# Patient Record
Sex: MALE | Race: WHITE | NOT HISPANIC OR LATINO | Employment: FULL TIME | URBAN - METROPOLITAN AREA
[De-identification: names, ages, dates, MRNs, and addresses within clinical notes are randomized per-mention and may not be internally consistent; named-entity substitution may affect disease eponyms.]

---

## 2023-02-08 ENCOUNTER — APPOINTMENT (OUTPATIENT)
Dept: MRI IMAGING | Facility: CLINIC | Age: 58
End: 2023-02-08
Attending: INTERNAL MEDICINE
Payer: COMMERCIAL

## 2023-02-08 ENCOUNTER — APPOINTMENT (OUTPATIENT)
Dept: CT IMAGING | Facility: CLINIC | Age: 58
End: 2023-02-08
Attending: EMERGENCY MEDICINE
Payer: COMMERCIAL

## 2023-02-08 ENCOUNTER — HOSPITAL ENCOUNTER (OUTPATIENT)
Facility: CLINIC | Age: 58
Setting detail: OBSERVATION
Discharge: HOME | End: 2023-02-12
Attending: EMERGENCY MEDICINE | Admitting: EMERGENCY MEDICINE
Payer: COMMERCIAL

## 2023-02-08 DIAGNOSIS — I10 HYPERTENSION, UNSPECIFIED TYPE: ICD-10-CM

## 2023-02-08 DIAGNOSIS — E78.5 HYPERLIPIDEMIA WITH TARGET LDL LESS THAN 130: Primary | ICD-10-CM

## 2023-02-08 DIAGNOSIS — R94.39 ABNORMAL CARDIOVASCULAR STRESS TEST: ICD-10-CM

## 2023-02-08 DIAGNOSIS — I48.0 PAROXYSMAL ATRIAL FIBRILLATION (H): ICD-10-CM

## 2023-02-08 DIAGNOSIS — I25.110 CORONARY ARTERY DISEASE INVOLVING NATIVE CORONARY ARTERY OF NATIVE HEART WITH UNSTABLE ANGINA PECTORIS (H): ICD-10-CM

## 2023-02-08 DIAGNOSIS — R20.2 PARESTHESIAS: ICD-10-CM

## 2023-02-08 DIAGNOSIS — R94.31 ABNORMAL ELECTROCARDIOGRAM: ICD-10-CM

## 2023-02-08 DIAGNOSIS — R07.9 CHEST PAIN, UNSPECIFIED TYPE: ICD-10-CM

## 2023-02-08 LAB
ALBUMIN SERPL BCG-MCNC: 4.6 G/DL (ref 3.5–5.2)
ALBUMIN UR-MCNC: NEGATIVE MG/DL
ALP SERPL-CCNC: 52 U/L (ref 40–129)
ALT SERPL W P-5'-P-CCNC: 23 U/L (ref 10–50)
ANION GAP SERPL CALCULATED.3IONS-SCNC: 12 MMOL/L (ref 7–15)
APPEARANCE UR: CLEAR
AST SERPL W P-5'-P-CCNC: 32 U/L (ref 10–50)
ATRIAL RATE - MUSE: 100 BPM
ATRIAL RATE - MUSE: 79 BPM
BASOPHILS # BLD AUTO: 0 10E3/UL (ref 0–0.2)
BASOPHILS NFR BLD AUTO: 0 %
BILIRUB SERPL-MCNC: 0.6 MG/DL
BILIRUB UR QL STRIP: NEGATIVE
BUN SERPL-MCNC: 9.9 MG/DL (ref 6–20)
CALCIUM SERPL-MCNC: 9.3 MG/DL (ref 8.6–10)
CHLORIDE SERPL-SCNC: 98 MMOL/L (ref 98–107)
COLOR UR AUTO: NORMAL
CREAT SERPL-MCNC: 0.88 MG/DL (ref 0.67–1.17)
DEPRECATED HCO3 PLAS-SCNC: 27 MMOL/L (ref 22–29)
DIASTOLIC BLOOD PRESSURE - MUSE: NORMAL MMHG
DIASTOLIC BLOOD PRESSURE - MUSE: NORMAL MMHG
EOSINOPHIL # BLD AUTO: 0.1 10E3/UL (ref 0–0.7)
EOSINOPHIL NFR BLD AUTO: 2 %
ERYTHROCYTE [DISTWIDTH] IN BLOOD BY AUTOMATED COUNT: 12.2 % (ref 10–15)
GFR SERPL CREATININE-BSD FRML MDRD: >90 ML/MIN/1.73M2
GLUCOSE SERPL-MCNC: 150 MG/DL (ref 70–99)
GLUCOSE UR STRIP-MCNC: NEGATIVE MG/DL
HCT VFR BLD AUTO: 44.9 % (ref 40–53)
HGB BLD-MCNC: 14.7 G/DL (ref 13.3–17.7)
HGB UR QL STRIP: NEGATIVE
HOLD SPECIMEN: NORMAL
HOLD SPECIMEN: NORMAL
IMM GRANULOCYTES # BLD: 0 10E3/UL
IMM GRANULOCYTES NFR BLD: 0 %
INTERPRETATION ECG - MUSE: NORMAL
INTERPRETATION ECG - MUSE: NORMAL
KETONES UR STRIP-MCNC: NEGATIVE MG/DL
LEUKOCYTE ESTERASE UR QL STRIP: NEGATIVE
LYMPHOCYTES # BLD AUTO: 2.6 10E3/UL (ref 0.8–5.3)
LYMPHOCYTES NFR BLD AUTO: 31 %
MCH RBC QN AUTO: 28.1 PG (ref 26.5–33)
MCHC RBC AUTO-ENTMCNC: 32.7 G/DL (ref 31.5–36.5)
MCV RBC AUTO: 86 FL (ref 78–100)
MONOCYTES # BLD AUTO: 0.6 10E3/UL (ref 0–1.3)
MONOCYTES NFR BLD AUTO: 8 %
NEUTROPHILS # BLD AUTO: 4.9 10E3/UL (ref 1.6–8.3)
NEUTROPHILS NFR BLD AUTO: 59 %
NITRATE UR QL: NEGATIVE
NRBC # BLD AUTO: 0 10E3/UL
NRBC BLD AUTO-RTO: 0 /100
P AXIS - MUSE: 45 DEGREES
P AXIS - MUSE: 52 DEGREES
PH UR STRIP: 6.5 [PH] (ref 5–7)
PLATELET # BLD AUTO: 177 10E3/UL (ref 150–450)
POTASSIUM SERPL-SCNC: 3.5 MMOL/L (ref 3.4–5.3)
PR INTERVAL - MUSE: 166 MS
PR INTERVAL - MUSE: 176 MS
PROT SERPL-MCNC: 7.2 G/DL (ref 6.4–8.3)
QRS DURATION - MUSE: 140 MS
QRS DURATION - MUSE: 146 MS
QT - MUSE: 392 MS
QT - MUSE: 434 MS
QTC - MUSE: 497 MS
QTC - MUSE: 505 MS
R AXIS - MUSE: 14 DEGREES
R AXIS - MUSE: 28 DEGREES
RBC # BLD AUTO: 5.24 10E6/UL (ref 4.4–5.9)
RBC URINE: <1 /HPF
SODIUM SERPL-SCNC: 137 MMOL/L (ref 136–145)
SP GR UR STRIP: 1.02 (ref 1–1.03)
SYSTOLIC BLOOD PRESSURE - MUSE: NORMAL MMHG
SYSTOLIC BLOOD PRESSURE - MUSE: NORMAL MMHG
T AXIS - MUSE: 111 DEGREES
T AXIS - MUSE: 118 DEGREES
TROPONIN T SERPL HS-MCNC: 12 NG/L
TROPONIN T SERPL HS-MCNC: 14 NG/L
TROPONIN T SERPL HS-MCNC: 17 NG/L
TROPONIN T SERPL HS-MCNC: 9 NG/L
UROBILINOGEN UR STRIP-MCNC: NORMAL MG/DL
VENTRICULAR RATE- MUSE: 100 BPM
VENTRICULAR RATE- MUSE: 79 BPM
WBC # BLD AUTO: 8.3 10E3/UL (ref 4–11)
WBC URINE: <1 /HPF

## 2023-02-08 PROCEDURE — 81001 URINALYSIS AUTO W/SCOPE: CPT | Performed by: EMERGENCY MEDICINE

## 2023-02-08 PROCEDURE — 93005 ELECTROCARDIOGRAM TRACING: CPT | Mod: 76

## 2023-02-08 PROCEDURE — 84484 ASSAY OF TROPONIN QUANT: CPT | Performed by: EMERGENCY MEDICINE

## 2023-02-08 PROCEDURE — 250N000013 HC RX MED GY IP 250 OP 250 PS 637: Performed by: INTERNAL MEDICINE

## 2023-02-08 PROCEDURE — 93005 ELECTROCARDIOGRAM TRACING: CPT

## 2023-02-08 PROCEDURE — 74177 CT ABD & PELVIS W/CONTRAST: CPT

## 2023-02-08 PROCEDURE — 99285 EMERGENCY DEPT VISIT HI MDM: CPT | Mod: 25

## 2023-02-08 PROCEDURE — 36415 COLL VENOUS BLD VENIPUNCTURE: CPT | Performed by: EMERGENCY MEDICINE

## 2023-02-08 PROCEDURE — 96360 HYDRATION IV INFUSION INIT: CPT | Mod: 59

## 2023-02-08 PROCEDURE — 96361 HYDRATE IV INFUSION ADD-ON: CPT

## 2023-02-08 PROCEDURE — 99222 1ST HOSP IP/OBS MODERATE 55: CPT | Mod: AI | Performed by: INTERNAL MEDICINE

## 2023-02-08 PROCEDURE — 70450 CT HEAD/BRAIN W/O DYE: CPT

## 2023-02-08 PROCEDURE — 80053 COMPREHEN METABOLIC PANEL: CPT | Performed by: EMERGENCY MEDICINE

## 2023-02-08 PROCEDURE — 84484 ASSAY OF TROPONIN QUANT: CPT | Mod: 91 | Performed by: INTERNAL MEDICINE

## 2023-02-08 PROCEDURE — 36415 COLL VENOUS BLD VENIPUNCTURE: CPT | Performed by: INTERNAL MEDICINE

## 2023-02-08 PROCEDURE — 258N000003 HC RX IP 258 OP 636: Performed by: EMERGENCY MEDICINE

## 2023-02-08 PROCEDURE — G0378 HOSPITAL OBSERVATION PER HR: HCPCS

## 2023-02-08 PROCEDURE — 250N000009 HC RX 250: Performed by: EMERGENCY MEDICINE

## 2023-02-08 PROCEDURE — 70551 MRI BRAIN STEM W/O DYE: CPT

## 2023-02-08 PROCEDURE — 250N000011 HC RX IP 250 OP 636: Performed by: EMERGENCY MEDICINE

## 2023-02-08 PROCEDURE — 85025 COMPLETE CBC W/AUTO DIFF WBC: CPT | Performed by: EMERGENCY MEDICINE

## 2023-02-08 RX ORDER — LISINOPRIL 40 MG/1
40 TABLET ORAL DAILY
COMMUNITY
End: 2023-07-17

## 2023-02-08 RX ORDER — NITROGLYCERIN 0.4 MG/1
0.4 TABLET SUBLINGUAL EVERY 5 MIN PRN
Status: DISCONTINUED | OUTPATIENT
Start: 2023-02-08 | End: 2023-02-12 | Stop reason: HOSPADM

## 2023-02-08 RX ORDER — ASPIRIN 81 MG/1
162 TABLET, CHEWABLE ORAL ONCE
Status: DISCONTINUED | OUTPATIENT
Start: 2023-02-08 | End: 2023-02-10

## 2023-02-08 RX ORDER — LISINOPRIL 40 MG/1
40 TABLET ORAL DAILY
Status: DISCONTINUED | OUTPATIENT
Start: 2023-02-08 | End: 2023-02-12 | Stop reason: HOSPADM

## 2023-02-08 RX ORDER — METOPROLOL SUCCINATE 100 MG/1
100 TABLET, EXTENDED RELEASE ORAL DAILY
COMMUNITY
End: 2023-07-17

## 2023-02-08 RX ORDER — UREA 10 %
500 LOTION (ML) TOPICAL DAILY
COMMUNITY

## 2023-02-08 RX ORDER — ROSUVASTATIN CALCIUM 20 MG/1
20 TABLET, COATED ORAL DAILY
Status: ON HOLD | COMMUNITY
End: 2023-02-12

## 2023-02-08 RX ORDER — ESCITALOPRAM OXALATE 10 MG/1
10 TABLET ORAL DAILY
COMMUNITY

## 2023-02-08 RX ORDER — FEXOFENADINE HCL 180 MG/1
180 TABLET ORAL DAILY
Status: ON HOLD | COMMUNITY
End: 2023-02-12

## 2023-02-08 RX ORDER — MULTIVIT WITH MINERALS/LUTEIN
1000 TABLET ORAL DAILY
COMMUNITY

## 2023-02-08 RX ORDER — SODIUM CHLORIDE 9 MG/ML
INJECTION, SOLUTION INTRAVENOUS CONTINUOUS
Status: DISCONTINUED | OUTPATIENT
Start: 2023-02-08 | End: 2023-02-09

## 2023-02-08 RX ORDER — HYDRALAZINE HYDROCHLORIDE 20 MG/ML
10 INJECTION INTRAMUSCULAR; INTRAVENOUS EVERY 4 HOURS PRN
Status: DISCONTINUED | OUTPATIENT
Start: 2023-02-08 | End: 2023-02-08

## 2023-02-08 RX ORDER — ROSUVASTATIN CALCIUM 10 MG/1
20 TABLET, COATED ORAL AT BEDTIME
Status: DISCONTINUED | OUTPATIENT
Start: 2023-02-08 | End: 2023-02-10

## 2023-02-08 RX ORDER — ACETAMINOPHEN 325 MG/1
650 TABLET ORAL EVERY 6 HOURS PRN
Status: DISCONTINUED | OUTPATIENT
Start: 2023-02-08 | End: 2023-02-12 | Stop reason: HOSPADM

## 2023-02-08 RX ORDER — MAGNESIUM HYDROXIDE/ALUMINUM HYDROXICE/SIMETHICONE 120; 1200; 1200 MG/30ML; MG/30ML; MG/30ML
30 SUSPENSION ORAL EVERY 4 HOURS PRN
Status: DISCONTINUED | OUTPATIENT
Start: 2023-02-08 | End: 2023-02-12 | Stop reason: HOSPADM

## 2023-02-08 RX ORDER — ASPIRIN 81 MG/1
81 TABLET ORAL DAILY
Status: DISCONTINUED | OUTPATIENT
Start: 2023-02-09 | End: 2023-02-12 | Stop reason: HOSPADM

## 2023-02-08 RX ORDER — ACETAMINOPHEN 650 MG/1
650 SUPPOSITORY RECTAL EVERY 6 HOURS PRN
Status: DISCONTINUED | OUTPATIENT
Start: 2023-02-08 | End: 2023-02-12 | Stop reason: HOSPADM

## 2023-02-08 RX ORDER — ESCITALOPRAM OXALATE 10 MG/1
10 TABLET ORAL AT BEDTIME
Status: DISCONTINUED | OUTPATIENT
Start: 2023-02-08 | End: 2023-02-12 | Stop reason: HOSPADM

## 2023-02-08 RX ORDER — IOPAMIDOL 755 MG/ML
80 INJECTION, SOLUTION INTRAVASCULAR ONCE
Status: COMPLETED | OUTPATIENT
Start: 2023-02-08 | End: 2023-02-08

## 2023-02-08 RX ORDER — NITROGLYCERIN 0.4 MG/1
0.4 TABLET SUBLINGUAL ONCE
Status: COMPLETED | OUTPATIENT
Start: 2023-02-08 | End: 2023-02-08

## 2023-02-08 RX ORDER — HYDRALAZINE HYDROCHLORIDE 20 MG/ML
10 INJECTION INTRAMUSCULAR; INTRAVENOUS EVERY 4 HOURS PRN
Status: DISCONTINUED | OUTPATIENT
Start: 2023-02-08 | End: 2023-02-09

## 2023-02-08 RX ORDER — CYANOCOBALAMIN (VITAMIN B-12) 500 MCG
400 LOZENGE ORAL DAILY
COMMUNITY

## 2023-02-08 RX ADMIN — ROSUVASTATIN CALCIUM 20 MG: 10 TABLET, FILM COATED ORAL at 20:21

## 2023-02-08 RX ADMIN — LISINOPRIL 40 MG: 40 TABLET ORAL at 20:21

## 2023-02-08 RX ADMIN — SODIUM CHLORIDE: 9 INJECTION, SOLUTION INTRAVENOUS at 20:22

## 2023-02-08 RX ADMIN — SODIUM CHLORIDE 1000 ML: 9 INJECTION, SOLUTION INTRAVENOUS at 14:22

## 2023-02-08 RX ADMIN — SODIUM CHLORIDE: 9 INJECTION, SOLUTION INTRAVENOUS at 16:13

## 2023-02-08 RX ADMIN — SODIUM CHLORIDE 80 ML: 900 INJECTION INTRAVENOUS at 13:25

## 2023-02-08 RX ADMIN — IOPAMIDOL 80 ML: 755 INJECTION, SOLUTION INTRAVENOUS at 13:25

## 2023-02-08 RX ADMIN — ESCITALOPRAM OXALATE 10 MG: 10 TABLET, FILM COATED ORAL at 20:22

## 2023-02-08 ASSESSMENT — ACTIVITIES OF DAILY LIVING (ADL)
ADLS_ACUITY_SCORE: 35
ADLS_ACUITY_SCORE: 35
ADLS_ACUITY_SCORE: 31
ADLS_ACUITY_SCORE: 35

## 2023-02-08 ASSESSMENT — ENCOUNTER SYMPTOMS
HEADACHES: 1
HEMATURIA: 0
DIARRHEA: 0
NUMBNESS: 1
DYSURIA: 0
BLOOD IN STOOL: 0
ABDOMINAL PAIN: 0
NERVOUS/ANXIOUS: 1

## 2023-02-08 NOTE — ED NOTES
[facility]  ED Nurse Handoff Report    ED Chief complaint: Chest Pain and Stroke Symptoms      ED Diagnosis:   Final diagnoses:   Chest pain, unspecified type   Paresthesias   Abnormal electrocardiogram       Code Status: To be addressed by admitting MD.     Allergies:   Allergies   Allergen Reactions    Penicillin G Rash       Patient Story:   Pt presents to the ED via EMS for evaluation of chest pressure and left sided tingling.     Focused Assessment:    Cardiac: Left bundle branch block. Tachycardiac in the 110s. 0/10 chest pain in ED when offered Nitroglycerin.   Respiratory: even and unlabored.   Neuro: Alert and oriented x4. Speech clear and logical, no numbness or tingling in ED.     Plan for MRI and Stress Test. Family at bedside in the ED.     Treatments and/or interventions provided:   Labs Ordered and Resulted from Time of ED Arrival to Time of ED Departure   COMPREHENSIVE METABOLIC PANEL - Abnormal       Result Value    Sodium 137      Potassium 3.5      Chloride 98      Carbon Dioxide (CO2) 27      Anion Gap 12      Urea Nitrogen 9.9      Creatinine 0.88      Calcium 9.3      Glucose 150 (*)     Alkaline Phosphatase 52      AST 32      ALT 23      Protein Total 7.2      Albumin 4.6      Bilirubin Total 0.6      GFR Estimate >90     TROPONIN T, HIGH SENSITIVITY - Normal    Troponin T, High Sensitivity 9     CBC WITH PLATELETS AND DIFFERENTIAL    WBC Count 8.3      RBC Count 5.24      Hemoglobin 14.7      Hematocrit 44.9      MCV 86      MCH 28.1      MCHC 32.7      RDW 12.2      Platelet Count 177      % Neutrophils 59      % Lymphocytes 31      % Monocytes 8      % Eosinophils 2      % Basophils 0      % Immature Granulocytes 0      NRBCs per 100 WBC 0      Absolute Neutrophils 4.9      Absolute Lymphocytes 2.6      Absolute Monocytes 0.6      Absolute Eosinophils 0.1      Absolute Basophils 0.0      Absolute Immature Granulocytes 0.0      Absolute NRBCs 0.0     ROUTINE UA WITH  MICROSCOPIC REFLEX TO CULTURE       Patient's response to treatments and/or interventions: Tolerated well     To be done/followed up on inpatient unit:  Further evaluation with Cardiology.     Does this patient have any cognitive concerns?:  NA    Activity level - Baseline/Home:  Independent  Activity Level - Current:   Stand with Assist    Patient's Preferred language: English   Needed?: No    Isolation: None  Infection: Not Applicable  Patient tested for COVID 19 prior to admission: YES  Bariatric?: No    Vital Signs:   Vitals:    02/08/23 1426 02/08/23 1440 02/08/23 1441 02/08/23 1456   BP: (!) 164/103  (!) 155/93    Pulse: 114 87 86 82   Resp: 19 15 17 16   Temp:       TempSrc:       SpO2: 93% 93% 94% 93%   Weight:       Height:           Cardiac Rhythm:Cardiac Rhythm: (S) Sinus tachycardia    Was the PSS-3 completed:   Yes  What interventions are required if any?               Family Comments: Family at bedside in ED.   OBS brochure/video discussed/provided to patient/family: N/A              Name of person given brochure if not patient: NA              Relationship to patient: NA    For the majority of the shift this patient's behavior was Green.   Behavioral interventions performed were frequent rounding.    ED NURSE PHONE NUMBER: [phone]

## 2023-02-08 NOTE — H&P
[facility]    History and Physical  Hospitalist       Date of Admission:  2/8/2023    Assessment & Plan   Maximus Harris Jr. is a 58 year old male who with history of hypertension, hyperlipidemia and anxiety presented to the emergency room with chest pain.    Chest pain  Essential hypertension  Hyperlipidemia  -Patient presents with atypical chest pain  -Initial troponin is negative  -EKG shows left bundle branch block which is of unknown chronicity, but on reviewing stress echo from 2014, patient did develop a left bundle branch block with exercise, therefore suspect this is old.  -Last stress test was in 2014 also was discharged on event monitor at that time and was found to have sinus dysrhythmia.  -I do suspect majority of his symptoms are related to anxiety, however he has significant risk factors therefore it will be prudent to do a stress test in the morning  -CT aortic survey with contrast was negative  -If he rules out we will get a nuclear stress test in the morning.  -Resume prior to admission lisinopril  -Hold metoprolol for stress test tomorrow  -Pressure is quite uncontrolled, likely will need to add low-dose diuretic  -As needed hydralazine available for now  -Wife also mentioned that patient snores a lot, given his uncontrolled blood pressure I advised them considering sleep study as outpatient.    Left upper extremity and circumoral tingling  -Patient endorses tingling involving entire left upper extremity and also circumoral area.  Again suspect this is due to anxiety however he also has a significant headache at the moment which is very unusual for him  -CT head is negative  -We will get an MRI of the brain     Right posterior chest wall pain  -Patient endorses pain around the right trapezius muscle, reportedly he has recently started elliptical training  -I suspect this is musculoskeletal, if cardiac cause ruled out, he can follow-up with PCP  -Denies any radicular  "symptoms on the right side or midline back pain.    Anxiety  -Suspect majority of his symptoms are related to anxiety although will need to rule out organic cause  -Resume prior to admission Lexapro and close follow-up with PCP      Clinically Significant Risk Factors Present on Admission                  # Hypertension: home medication list includes antihypertensive(s)      # Overweight: Estimated body mass index is 27.66 kg/m  as calculated from the following:    Height as of this encounter: 1.765 m (5' 9.5\").    Weight as of this encounter: 86.2 kg (190 lb).             Medical Decision Making       **CLEAR ALL SELECTIONS**        DVT Prophylaxis: Pneumatic Compression Devices  Code Status: Full Code    Disposition: Expected discharge in < 2 days    Figueroa Hartley MD, MD    Primary Care Physician   Abram Bethea    Chief Complaint   Chest pain    History is obtained from the patient    History of Present Illness   Maximus Harris Jr. is a 58 year old male who presents with chest pain.  Patient mentions that he initially noted pain around the right shoulder blade few weeks ago.  He was not sure what it might be related to but he recently started doing elliptical and thought he might have tweaked his muscles.  However earlier today he had some left-sided chest pain which was about 3/10 in intensity associated with left arm tingling and circumoral tingling at work which bothered him and he requested his boss to drive him to the hospital.  He denies dyspnea per se.  No cough.  No fever.  He does exercise on an elliptical for about an hour and does not report any exertional chest pain.  Denies visual changes.  No diplopia.  No slurring of speech.  No facial deviation.  No weakness of any extremities.  No tingling/numbness of lower extremities.  Denies nausea, vomiting.  No dysuria urinary urgency or frequency.  Wife reports that he does snore quite a bit at night.  She also noticed couple of days ago he woke " up in the middle of the night and was sweating quite a bit.  Denies any escalated level of stress at work.  He does have a history of anxiety but does not believe that this is any worse than baseline.  He tells me that his blood pressure does stay on the higher side around mid 140s/90s despite being on 2 antihypertensives.    Past Medical History    I have reviewed this patient's medical history and updated it with pertinent information if needed.   Past Medical History:   Diagnosis Date     HTN (hypertension)      Hyperlipidemia LDL goal < 130      Nephrolithiasis        Past Surgical History   I have reviewed this patient's surgical history and updated it with pertinent information if needed.  Past Surgical History:   Procedure Laterality Date     HERNIA REPAIR, INGUINAL RT/LT         Prior to Admission Medications   Prior to Admission Medications   Prescriptions Last Dose Informant Patient Reported? Taking?   aspirin 81 MG tablet   No No   Sig: Take 1 tablet (81 mg) by mouth daily   Patient taking differently: Take 81 mg by mouth every evening    atorvastatin (LIPITOR) 10 MG tablet   No No   Sig: Take 1 tablet (10 mg) by mouth daily   Patient taking differently: Take 10 mg by mouth every evening    cetirizine (ZYRTEC) 10 MG tablet   Yes No   Sig: Take 10 mg by mouth daily   lisinopril (PRINIVIL,ZESTRIL) 20 MG tablet   No No   Sig: Take 1 tablet (20 mg) by mouth daily   metoprolol (LOPRESSOR) 50 MG tablet   No No   Sig: Take 1 tablet (50 mg) by mouth 2 times daily   multivitamin, therapeutic with minerals (THERA-VIT-M) TABS   Yes No   Sig: Take 1 tablet by mouth daily      Facility-Administered Medications: None     Allergies   Allergies   Allergen Reactions     Penicillin G Rash       Social History   I have reviewed this patient's social history and updated it with pertinent information if needed. Maximus Harris Jr.  reports that he has never smoked. He does not have any smokeless tobacco history on file. He  reports that he does not drink alcohol and does not use drugs.    Family History   I have reviewed this patient's family history and updated it with pertinent information if needed.   Family History   Problem Relation Age of Onset     Heart Disease Mother         MI     Heart Disease Father         MI, COPD     Arthritis Brother         lupus     Cancer Sister         ovarian cancer DM     Diabetes Sister        Review of Systems   The 10 point Review of Systems is negative other than noted in the HPI or here.     Physical Exam   Temp: 98  F (36.7  C) Temp src: Temporal BP: (!) 155/93 Pulse: 82   Resp: 16 SpO2: 93 % O2 Device: None (Room air)    Vital Signs with Ranges  Temp:  [98  F (36.7  C)] 98  F (36.7  C)  Pulse:  [] 82  Resp:  [10-21] 16  BP: (155-209)/() 155/93  SpO2:  [93 %-97 %] 93 %  190 lbs 0 oz    Gen: AAOX3, NAD, anxious  HEENT: Moist oral mucosa, no pallor or icterus  Neck: Supple neck, good ROM, no stridor  Resp: CTA B/L, normal WOB; no crackles; no wheezes  CVS- RRR, no murmur, no edema  Abd/GI: Soft, non-tender. BS- normoactive  Skin- Warm, dry, no rashes  MSK: ?  Mild tenderness over right trapezius muscle, no significant restriction of movement.  No midline tenderness.  Neuro- CN- intact. Moving X 4; No focal deficits.     Data   Data reviewed today:  I personally reviewed the EKG tracing showing Normal sinus rhythm with features of LV hypertrophy and LBBB.        Recent Labs   Lab 02/08/23  1316   WBC 8.3   HGB 14.7   MCV 86         POTASSIUM 3.5   CHLORIDE 98   CO2 27   BUN 9.9   CR 0.88   ANIONGAP 12   DAVID 9.3   *   ALBUMIN 4.6   PROTTOTAL 7.2   BILITOTAL 0.6   ALKPHOS 52   ALT 23   AST 32       Recent Results (from the past 24 hour(s))   Head CT w/o contrast    Narrative    CT SCAN OF THE HEAD WITHOUT CONTRAST   2/8/2023 1:43 PM     HISTORY: Headache, left arm paresthesias.    TECHNIQUE: Axial images of the head and coronal reformations without  IV contrast  material. Radiation dose for this scan was reduced using  automated exposure control, adjustment of the mA and/or kV according  to patient size, or iterative reconstruction technique.    COMPARISON: None.    FINDINGS:  No evidence of hemorrhage. Parenchyma within normal limits for age. No  acute osseous abnormality.      Impression    IMPRESSION:   No acute intracranial abnormality.     CT Aortic Survey w Contrast    Narrative    CT AORTIC SURVEY WITH CONTRAST 2/8/2023 1:44 PM    CLINICAL HISTORY: Acute left chest tightness and left arm  paresthesias. Right upper back and shoulder pain. Headache.    TECHNIQUE: Aortic survey protocol CT chest, abdomen, and pelvis.  Arterial phase through the chest, abdomen, and pelvis. Precontrast  images were also performed through the chest. Dose reduction  techniques were used.   CONTRAST: 80 mL [id number]    COMPARISON: None.    FINDINGS:  VASCULATURE: No evidence for aortic aneurysm or dissection. The great  vessels, renal arteries, celiac artery, SMA, and RABIA are patent. There  is mild atherosclerotic calcification of the abdominal aorta. The  pulmonary arteries are moderately well opacified, and there is no  evidence for pulmonary embolism. Circumaortic left renal vein.    LUNGS AND PLEURA: No pleural effusions. Small calcified granuloma in  the right middle lobe. The lungs are otherwise clear. No lung masses  or consolidations. No pneumothorax.    MEDIASTINUM/AXILLAE: No enlarged lymph nodes in the chest. No  pericardial effusion.    CORONARY ARTERY CALCIFICATION: Mild.    HEPATOBILIARY: Hepatic steatosis. No hepatic masses. No calcified  gallstones.    PANCREAS: Normal.    SPLEEN: Normal.    ADRENAL GLANDS: Normal.    KIDNEYS/BLADDER: Mild diffuse bladder wall thickening could be related  to prostatic enlargement, although cystitis could also have this  appearance. The kidneys are unremarkable. No hydronephrosis.    BOWEL: No bowel obstruction. No convincing evidence for  colitis or  diverticulitis. Unremarkable appendix.    PELVIC ORGANS: Mild prostatic enlargement.    LYMPH NODES: No enlarged lymph nodes are identified in the abdomen or  pelvis.    ADDITIONAL FINDINGS: None.    MUSCULOSKELETAL: Unremarkable.      Impression    IMPRESSION:  1.  No evidence for aortic aneurysm or dissection.  2.  Hepatic steatosis.  3.  Mild diffuse bladder wall thickening could be related to prostatic  enlargement, although cystitis could also have this appearance.    SALMA LYMAN MD         SYSTEM ID:  [id number]

## 2023-02-08 NOTE — Clinical Note
The first balloon was inserted into the left anterior descending.Max pressure = 8 alyssia. Total duration = 16 seconds.

## 2023-02-08 NOTE — Clinical Note
The first balloon was inserted into the left anterior descending.Max pressure = 12 alyssia. Total duration = 15 seconds.     Max pressure = 14 alyssia. Total duration = 15 seconds.    Balloon reinflated a second time: Max pressure = 14 alyssia. Total duration = 15 seconds.  Balloon reinflated a third time: Max pressure = 14 alyssia. Total duration = 9 seconds.

## 2023-02-08 NOTE — ED PROVIDER NOTES
History     Chief Complaint:  Chest Pain and Stroke Symptoms       HPI   Maximus Harris is a 58 year old male with a history of Hypertension, Hyperlipidemia, and anxiety who presents with chest pain that started earlier today. The patient reports the last two weeks he has had an intermittent abnormal tingling sensation around the right shoulder blade radiating into the shoulder, neck and the head. Patient reports that today he is having some left arm tingling, but denies any right sided symptoms. He says that this tingling started around 0915 this morning and was really bad by noon. Patient reports feeling moderately light headed and mildly nauseated. He denies abdominal pain, diarrhea, blood in the stool, dysuria, hematuria. Patient says that currently he does not have chest pain however notes some right sided chest tightness. He denies leg swelling. He denies history of DVT, PE, or recent surgeries. Patient says that today is the first time he has felt any left sided symptoms and specifies that today was also the first time he has had chest symptoms. Patient says at 0915 he was just sitting down at work. Patient reports that he is most concerned about his headache and paresthesias currently. He says that he feels very anxious.     Independent Historian:   None - Patient Only    Review of External Notes:  Looked through care everywhere for old EKGs or old EKG readings included in the note from May 20, 2022 by Dr.Leong Unger, but none found.    ROS:  Review of Systems   Cardiovascular: Positive for chest pain. Negative for leg swelling.   Gastrointestinal: Negative for abdominal pain, blood in stool and diarrhea.   Genitourinary: Negative for dysuria and hematuria.   Neurological: Positive for numbness and headaches.   Psychiatric/Behavioral: The patient is nervous/anxious.    All other systems reviewed and are negative.    Allergies:  Penicillin G     Medications:   "  Aspirin  Atorvastatin  Lisinopril  Metoprolol  escitalopram  Rosuvastatin    Past Medical History:   Hypertension   Hyperlipidemia   nephrolithiasis   Anxiety   B12 deficiency  Vertigo     Past Surgical History:    Hernia repair  Lithotripsy   Refractive surgery     Family History:    Cancer  Diabetes  Heart disease    Social History:  Reports that he has never smoked. He does not have any smokeless tobacco history on file. He reports that he does not drink alcohol and does not use drugs.    Physical Exam     Patient Vitals for the past 24 hrs:   BP Temp Temp src Pulse Resp SpO2 Height Weight   02/08/23 1527 -- -- -- 89 26 95 % -- --   02/08/23 1524 -- -- -- 93 (!) 31 95 % -- --   02/08/23 1509 -- -- -- 87 25 92 % -- --   02/08/23 1456 -- -- -- 82 16 93 % -- --   02/08/23 1454 -- -- -- 90 16 93 % -- --   02/08/23 1441 (!) 155/93 -- -- 86 17 94 % -- --   02/08/23 1440 -- -- -- 87 15 93 % -- --   02/08/23 1426 (!) 164/103 -- -- 114 19 93 % -- --   02/08/23 1423 -- -- -- 90 14 93 % -- --   02/08/23 1410 (!) 155/86 -- -- 85 18 93 % -- --   02/08/23 1408 -- -- -- 83 10 93 % -- --   02/08/23 1353 -- -- -- 92 17 94 % -- --   02/08/23 1340 (!) 175/90 -- -- 97 17 96 % -- --   02/08/23 1338 -- -- -- 101 16 94 % -- --   02/08/23 1325 -- -- -- 107 21 96 % -- --   02/08/23 1323 (!) 209/117 -- -- -- -- -- -- --   02/08/23 1310 (!) 197/113 -- -- 110 -- 95 % -- --   02/08/23 1301 -- -- -- -- -- 97 % -- --   02/08/23 1259 (!) 189/109 -- -- 101 -- 96 % -- --   02/08/23 1252 (!) 190/111 98  F (36.7  C) Temporal 101 16 96 % 1.765 m (5' 9.5\") 86.2 kg (190 lb)      Physical Exam  General:  Sitting on bed. Uncomfortable appearing. Slightly diaphoretic.   HENT:  No obvious trauma to head  Right Ear:  External ear normal.   Left Ear:  External ear normal.   Nose:  Nose normal.   Eyes:  Conjunctivae and EOM are normal. Pupils are equal, round, and reactive.   Neck: Normal range of motion. Neck supple. No tracheal deviation present.   CV:  " Normal heart sounds. No murmur heard.  Pulm/Chest: Effort normal and breath sounds normal.   Abd: Soft. No distension. There is no tenderness. There is no rigidity, no rebound and no guarding.   M/S: Normal range of motion.   Neuro: Awake and alert. CN II-XII Grossly intact, no pronator drift, normal finger-nose-finger, visual fields intact by confrontation. Muscle strength is +5 proximal and distal in the bilateral upper and lower extremities. No dysarthria. Normal palm up, palm down. Radial pulse +2 bilateral.   Skin: Skin is warm and dry. No rash noted. Not diaphoretic.  No rash of shingles to chest or back bilateral.  Psych: Normal mood and affect. Behavior is normal.     Emergency Department Course   ECG #1  ECG taken at 1301, ECG read at 1302  Normal sinus rhythm. Possible left atrial enlargement. Left ventricular hypertrophy with QRS widening (estrada product). Nonspecific T wave abnormality. Abnormal ECG   New changes as compared to prior, dated 10/28/14. No other EKG in Care everywhere.   Rate 86 bpm. AL interval 170 ms. QRS duration 140 ms. QT/QTc 416/497 ms. P-R-T axes 45 21 95.     ECG #2  ECG taken at 1340, ECG read at 1341  Normal sinus rhythm. Possible left atrial enlargement. Left ventricular hypertrophy with QRS widening and repolarization abnormality (estrada product). Abnormal ECG  Rate 100 bpm. AL interval 166 ms. QRS duration 140 ms. QT/QTc 392/505 ms. P-R-T axes 52 14 111.    Imaging:  CT Aortic Survey w Contrast   Final Result   IMPRESSION:   1.  No evidence for aortic aneurysm or dissection.   2.  Hepatic steatosis.   3.  Mild diffuse bladder wall thickening could be related to prostatic   enlargement, although cystitis could also have this appearance.      SALMA LYMAN MD            SYSTEM ID:  [id number]      Head CT w/o contrast   Final Result   IMPRESSION:    No acute intracranial abnormality.      EMERSON GRAY MD            SYSTEM ID:  [id number]         Report per  radiology    Laboratory:  Labs Ordered and Resulted from Time of ED Arrival to Time of ED Departure   COMPREHENSIVE METABOLIC PANEL - Abnormal       Result Value    Sodium 137      Potassium 3.5      Chloride 98      Carbon Dioxide (CO2) 27      Anion Gap 12      Urea Nitrogen 9.9      Creatinine 0.88      Calcium 9.3      Glucose 150 (*)     Alkaline Phosphatase 52      AST 32      ALT 23      Protein Total 7.2      Albumin 4.6      Bilirubin Total 0.6      GFR Estimate >90     TROPONIN T, HIGH SENSITIVITY - Normal    Troponin T, High Sensitivity 9     ROUTINE UA WITH MICROSCOPIC REFLEX TO CULTURE - Normal    Color Urine Straw      Appearance Urine Clear      Glucose Urine Negative      Bilirubin Urine Negative      Ketones Urine Negative      Specific Gravity Urine 1.019      Blood Urine Negative      pH Urine 6.5      Protein Albumin Urine Negative      Urobilinogen Urine Normal      Nitrite Urine Negative      Leukocyte Esterase Urine Negative      RBC Urine <1      WBC Urine <1     CBC WITH PLATELETS AND DIFFERENTIAL    WBC Count 8.3      RBC Count 5.24      Hemoglobin 14.7      Hematocrit 44.9      MCV 86      MCH 28.1      MCHC 32.7      RDW 12.2      Platelet Count 177      % Neutrophils 59      % Lymphocytes 31      % Monocytes 8      % Eosinophils 2      % Basophils 0      % Immature Granulocytes 0      NRBCs per 100 WBC 0      Absolute Neutrophils 4.9      Absolute Lymphocytes 2.6      Absolute Monocytes 0.6      Absolute Eosinophils 0.1      Absolute Basophils 0.0      Absolute Immature Granulocytes 0.0      Absolute NRBCs 0.0        Emergency Department Course & Assessments:    Interventions:  Medications   0.9% sodium chloride BOLUS (0 mLs Intravenous Stopped 2/8/23 1527)     Followed by   sodium chloride 0.9% infusion (has no administration in time range)   iopamidol (ISOVUE-370) solution 80 mL (80 mLs Intravenous Given 2/8/23 1325)   Saline Flush (80 mLs Intravenous Given 2/8/23 1325)   nitroGLYcerin  (NITROSTAT) sublingual tablet 0.4 mg (0.4 mg Sublingual Not Given 2/8/23 1432)      Independent Interpretation (X-rays, CTs, rhythm strip):  Cardiac monitor shows a sinus rhythm    Consultations/Discussion of Management or Tests:  1445 I spoke with Dr. Whittington, cardiology, regarding the patient who recommend against Cath lab activation.   1453 I spoke with Dr. Hartley, hospitalist, regarding admission of the patient.     Social Determinants of Health affecting care:   None    Assessments:  1324 I examined the patient and obtained history as noted above.   1356 I rechecked the patient.     Disposition:  The patient was admitted to the hospital under the care of Dr. Hartley.     Impression & Plan    Medical Decision Making:  Maximus Harris Jr. is a very pleasant 58 year old year old patient who presents to the emergency department with concern of right-sided chest pain and upper back pain for the past few weeks, left arm paresthesias and mild headache that began at 915 today.  No trauma.  Patient uncomfortable appearing.  He was diaphoretic.  Screening EKG shows notable changes compared to his prior from 2014.  Care everywhere nor in chart review did we have an old EKG other than the one from 2014.  There is some slight diffuse ST changes that appears to have a convexity (but no concavity to suggest st elevation) and he has a widened QRS axis and this may be representative of a new left bundle branch block.  Given his history of right-sided back pain and now new neurologic symptoms, I considered aortic dissection and given his headache I also considered subarachnoid hemorrhage.  He has no focal neurologic deficit on exam.  A plain head CT shows no evidence of acute intracranial hemorrhage and given imaging obtained within 6 hours of onset of symptoms I doubt subarachnoid hemorrhage and do not believe LP is necessary at this time.  A CT of the aorta was also performed that showed no evidence of dissection.   Incidentals reviewed.  A second EKG was obtained that does show a few slight changes I believe related to lead placement.  Troponin is negative.  I had ordered nitroglycerin, but after the IV fluids alone, he felt better and had no right-sided chest pain nor has he had any left-sided chest pain or chest pressure.  Given the new abnormal EKG and numerous vague symptoms that are concerning and his initial presentation of being diaphoretic and uncomfortable appearing, he will be admitted to the hospital for observation.  I did speak to the cardiologist who recommended against activating the Cath Lab at this time, but to trend the troponin and get a formal echocardiogram.  Given the incidental finding on CT, urine analysis obtained that shows no evidence of urinary tract infection.  He has no focal neurologic deficit, but MRI could be considered of the brain during the admission.  I then spoke to the hospitalist and reviewed this treatment plan and he has accepted the patient for admission.    Patient has HEART score of 5 placing him at moderate risk. (age 1, risk factors 2, history 1, ekg 1)    Diagnosis:    ICD-10-CM    1. Chest pain, unspecified type  R07.9       2. Paresthesias  R20.2       3. Abnormal electrocardiogram  R94.31            Discharge Medications:  New Prescriptions    No medications on file          Scribe Disclosure:  I, Syed Cheek, am serving as a scribe at 1:50 PM on 2/8/2023 to document services personally performed by Gordon Larkin DO based on my observations and the provider's statements to me.     2/8/2023   Gordon Larkin DO Anderson, Robert James, DO  02/09/23 0723

## 2023-02-08 NOTE — PHARMACY-ADMISSION MEDICATION HISTORY
Pharmacy Medication History  Admission medication history interview status for the 2/8/2023  admission is complete. See EPIC admission navigator for prior to admission medications     Location of Interview: Patient room  Medication history sources: Patient, Patient's family/friend (family at bedside) and Surescripts    Significant changes made to the medication list:  Added lexapro, rosuvastatin, allegra, vitamin C, vitamin D, vitamin E, vitamin B12  Removed:   -multivitamin (taking separate vitamins)   -atorvastatin 10 mg (taking rosuvastatin)   -cetirizine 10 mg (taking Allegra)   Adjusted:   -lisinopril 20 mg --> 40 mg  -metoprolol tartrate 50 mg BID --> succinate 100 mg daily    In the past week, patient estimated taking medication this percent of the time: greater than 90%    Medication Affordability: Not addressed     Additional medication history information:   Patient and spouse at bedside are reliable historians. Patient takes medications midday and late evening PTA.     Medication reconciliation completed by provider prior to medication history? No    Time spent in this activity: 15 minutes    Medication Sig Last Dose Taking? Auth Provider   aspirin 81 MG tablet Take 1 tablet (81 mg) by mouth daily 2/7/2023 at 2230 Yes Abram Bethea MD   cyanocobalamin (VITAMIN B-12) 500 MCG tablet Take 500 mcg by mouth daily 2/7/2023 at 1200 Yes Unknown, Entered By History   escitalopram (LEXAPRO) 10 MG tablet Take 10 mg by mouth daily 2/7/2023 at 2230 Yes Unknown, Entered By History   fexofenadine (ALLEGRA) 180 MG tablet Take 180 mg by mouth daily 2/7/2023 at 1200 Yes Unknown, Entered By History   lisinopril (ZESTRIL) 40 MG tablet Take 40 mg by mouth daily 2/7/2023 at 1200 Yes Unknown, Entered By History   metoprolol succinate ER (TOPROL XL) 100 MG 24 hr tablet Take 100 mg by mouth daily 2/7/2023 at 1200 Yes Unknown, Entered By History   rosuvastatin (CRESTOR) 20 MG tablet Take 20 mg by mouth daily 2/7/2023 at 2230  Yes Unknown, Entered By History   vitamin C (ASCORBIC ACID) 1000 MG TABS Take 1,000 mg by mouth daily 2/7/2023 at 1200 Yes Unknown, Entered By History   Vitamin D3 (CHOLECALCIFEROL) 125 MCG (5000 UT) tablet Take 125 mcg by mouth daily 2/7/2023 at 1200 Yes Unknown, Entered By History   vitamin E 400 units TABS Take 400 Units by mouth daily 2/7/2023 at 1200 Yes Unknown, Entered By History       The information provided in this note is only as accurate as the sources available at the time of update(s)   Mayra Martell, PharmD

## 2023-02-08 NOTE — ED TRIAGE NOTES
Left hand tingling since 0915. Patient concerned for a heart attack. Some tingling in his lips this AM. Patient states did have some chest discomfort, states could be anxiety. Patient also having some neck pain. Has had multiple days of having back pain. A&Ox4. Speech clear. Strength equal bilaterally.      Triage Assessment     Row Name 02/08/23 1252       Triage Assessment (Adult)    Airway WDL WDL       Respiratory WDL    Respiratory WDL WDL       Skin Circulation/Temperature WDL    Skin Circulation/Temperature WDL WDL       Cardiac WDL    Cardiac WDL X       Peripheral/Neurovascular WDL    Peripheral Neurovascular WDL WDL       Cognitive/Neuro/Behavioral WDL    Cognitive/Neuro/Behavioral WDL X

## 2023-02-08 NOTE — Clinical Note
Balloon removed intact. Normal chest xray result reviewed.   toradol only for rib pain pending xray of rib

## 2023-02-08 NOTE — Clinical Note
Stent deployed in the left anterior descending. Max pressure = 8 alyssia. Total duration = 25 seconds.

## 2023-02-09 ENCOUNTER — APPOINTMENT (OUTPATIENT)
Dept: NUCLEAR MEDICINE | Facility: CLINIC | Age: 58
End: 2023-02-09
Attending: INTERNAL MEDICINE
Payer: COMMERCIAL

## 2023-02-09 ENCOUNTER — APPOINTMENT (OUTPATIENT)
Dept: CARDIOLOGY | Facility: CLINIC | Age: 58
End: 2023-02-09
Attending: INTERNAL MEDICINE
Payer: COMMERCIAL

## 2023-02-09 LAB
BI-PLANE LVEF ECHO: NORMAL
CV STRESS MAX HR HE: 110
ERYTHROCYTE [DISTWIDTH] IN BLOOD BY AUTOMATED COUNT: 12.1 % (ref 10–15)
GLUCOSE BLDC GLUCOMTR-MCNC: 117 MG/DL (ref 70–99)
HCT VFR BLD AUTO: 44.3 % (ref 40–53)
HGB BLD-MCNC: 15 G/DL (ref 13.3–17.7)
HOLD SPECIMEN: NORMAL
HOLD SPECIMEN: NORMAL
LVEF ECHO: NORMAL
MCH RBC QN AUTO: 28.7 PG (ref 26.5–33)
MCHC RBC AUTO-ENTMCNC: 33.9 G/DL (ref 31.5–36.5)
MCV RBC AUTO: 85 FL (ref 78–100)
NUC STRESS EJECTION FRACTION: 53 %
PLATELET # BLD AUTO: 179 10E3/UL (ref 150–450)
RATE PRESSURE PRODUCT: NORMAL
RBC # BLD AUTO: 5.22 10E6/UL (ref 4.4–5.9)
STRESS ECHO BASELINE DIASTOLIC HE: 104
STRESS ECHO BASELINE HR: 96 BPM
STRESS ECHO BASELINE SYSTOLIC BP: 191
STRESS ECHO CALCULATED PERCENT HR: 67 %
STRESS ECHO LAST STRESS DIASTOLIC BP: 106
STRESS ECHO LAST STRESS SYSTOLIC BP: 184
STRESS ECHO TARGET HR: 164
TROPONIN T SERPL HS-MCNC: 10 NG/L
TROPONIN T SERPL HS-MCNC: 11 NG/L
WBC # BLD AUTO: 10.3 10E3/UL (ref 4–11)

## 2023-02-09 PROCEDURE — 36415 COLL VENOUS BLD VENIPUNCTURE: CPT | Performed by: STUDENT IN AN ORGANIZED HEALTH CARE EDUCATION/TRAINING PROGRAM

## 2023-02-09 PROCEDURE — 93010 ELECTROCARDIOGRAM REPORT: CPT | Performed by: INTERNAL MEDICINE

## 2023-02-09 PROCEDURE — 78452 HT MUSCLE IMAGE SPECT MULT: CPT

## 2023-02-09 PROCEDURE — 96368 THER/DIAG CONCURRENT INF: CPT

## 2023-02-09 PROCEDURE — 99204 OFFICE O/P NEW MOD 45 MIN: CPT | Mod: 25 | Performed by: PHYSICIAN ASSISTANT

## 2023-02-09 PROCEDURE — 343N000001 HC RX 343: Performed by: INTERNAL MEDICINE

## 2023-02-09 PROCEDURE — 96375 TX/PRO/DX INJ NEW DRUG ADDON: CPT

## 2023-02-09 PROCEDURE — G0378 HOSPITAL OBSERVATION PER HR: HCPCS

## 2023-02-09 PROCEDURE — A9502 TC99M TETROFOSMIN: HCPCS | Performed by: INTERNAL MEDICINE

## 2023-02-09 PROCEDURE — 999N000049 HC STATISTIC ECHO STRESS OR NM NPI

## 2023-02-09 PROCEDURE — 96365 THER/PROPH/DIAG IV INF INIT: CPT | Mod: XU

## 2023-02-09 PROCEDURE — 999N000208 ECHOCARDIOGRAM COMPLETE

## 2023-02-09 PROCEDURE — 250N000011 HC RX IP 250 OP 636: Performed by: INTERNAL MEDICINE

## 2023-02-09 PROCEDURE — 99207 PR APP CREDIT; MD BILLING SHARED VISIT: CPT | Performed by: STUDENT IN AN ORGANIZED HEALTH CARE EDUCATION/TRAINING PROGRAM

## 2023-02-09 PROCEDURE — 93018 CV STRESS TEST I&R ONLY: CPT | Performed by: INTERNAL MEDICINE

## 2023-02-09 PROCEDURE — 85520 HEPARIN ASSAY: CPT | Performed by: STUDENT IN AN ORGANIZED HEALTH CARE EDUCATION/TRAINING PROGRAM

## 2023-02-09 PROCEDURE — 999N000157 HC STATISTIC RCP TIME EA 10 MIN

## 2023-02-09 PROCEDURE — 99291 CRITICAL CARE FIRST HOUR: CPT

## 2023-02-09 PROCEDURE — 99207 PR NO CHARGE LOS: CPT | Performed by: NURSE PRACTITIONER

## 2023-02-09 PROCEDURE — 93016 CV STRESS TEST SUPVJ ONLY: CPT | Performed by: INTERNAL MEDICINE

## 2023-02-09 PROCEDURE — 96361 HYDRATE IV INFUSION ADD-ON: CPT

## 2023-02-09 PROCEDURE — 83036 HEMOGLOBIN GLYCOSYLATED A1C: CPT | Performed by: PHYSICIAN ASSISTANT

## 2023-02-09 PROCEDURE — 250N000011 HC RX IP 250 OP 636

## 2023-02-09 PROCEDURE — 93017 CV STRESS TEST TRACING ONLY: CPT

## 2023-02-09 PROCEDURE — 85027 COMPLETE CBC AUTOMATED: CPT | Performed by: PHYSICIAN ASSISTANT

## 2023-02-09 PROCEDURE — 36415 COLL VENOUS BLD VENIPUNCTURE: CPT

## 2023-02-09 PROCEDURE — 96376 TX/PRO/DX INJ SAME DRUG ADON: CPT

## 2023-02-09 PROCEDURE — 36415 COLL VENOUS BLD VENIPUNCTURE: CPT | Performed by: PHYSICIAN ASSISTANT

## 2023-02-09 PROCEDURE — 82962 GLUCOSE BLOOD TEST: CPT

## 2023-02-09 PROCEDURE — 258N000003 HC RX IP 258 OP 636: Performed by: EMERGENCY MEDICINE

## 2023-02-09 PROCEDURE — 250N000013 HC RX MED GY IP 250 OP 250 PS 637: Performed by: PHYSICIAN ASSISTANT

## 2023-02-09 PROCEDURE — 84484 ASSAY OF TROPONIN QUANT: CPT

## 2023-02-09 PROCEDURE — 96366 THER/PROPH/DIAG IV INF ADDON: CPT

## 2023-02-09 PROCEDURE — 255N000002 HC RX 255 OP 636: Performed by: INTERNAL MEDICINE

## 2023-02-09 PROCEDURE — 93306 TTE W/DOPPLER COMPLETE: CPT | Mod: 26 | Performed by: INTERNAL MEDICINE

## 2023-02-09 PROCEDURE — 78452 HT MUSCLE IMAGE SPECT MULT: CPT | Mod: 26 | Performed by: INTERNAL MEDICINE

## 2023-02-09 PROCEDURE — 250N000013 HC RX MED GY IP 250 OP 250 PS 637: Performed by: INTERNAL MEDICINE

## 2023-02-09 PROCEDURE — 250N000011 HC RX IP 250 OP 636: Performed by: PHYSICIAN ASSISTANT

## 2023-02-09 PROCEDURE — 93005 ELECTROCARDIOGRAM TRACING: CPT

## 2023-02-09 RX ORDER — ACYCLOVIR 200 MG/1
0-1 CAPSULE ORAL
Status: DISCONTINUED | OUTPATIENT
Start: 2023-02-09 | End: 2023-02-09

## 2023-02-09 RX ORDER — LORAZEPAM 0.5 MG/1
0.5 TABLET ORAL
Status: DISCONTINUED | OUTPATIENT
Start: 2023-02-10 | End: 2023-02-10 | Stop reason: HOSPADM

## 2023-02-09 RX ORDER — REGADENOSON 0.08 MG/ML
0.4 INJECTION, SOLUTION INTRAVENOUS ONCE
Status: COMPLETED | OUTPATIENT
Start: 2023-02-09 | End: 2023-02-09

## 2023-02-09 RX ORDER — LIDOCAINE 40 MG/G
CREAM TOPICAL
Status: DISCONTINUED | OUTPATIENT
Start: 2023-02-10 | End: 2023-02-10 | Stop reason: HOSPADM

## 2023-02-09 RX ORDER — ASPIRIN 325 MG
325 TABLET ORAL ONCE
Status: COMPLETED | OUTPATIENT
Start: 2023-02-10 | End: 2023-02-10

## 2023-02-09 RX ORDER — SODIUM CHLORIDE 9 MG/ML
INJECTION, SOLUTION INTRAVENOUS CONTINUOUS
Status: DISCONTINUED | OUTPATIENT
Start: 2023-02-10 | End: 2023-02-09

## 2023-02-09 RX ORDER — HEPARIN SODIUM 10000 [USP'U]/100ML
0-5000 INJECTION, SOLUTION INTRAVENOUS CONTINUOUS
Status: DISCONTINUED | OUTPATIENT
Start: 2023-02-09 | End: 2023-02-09

## 2023-02-09 RX ORDER — HYDRALAZINE HYDROCHLORIDE 20 MG/ML
10 INJECTION INTRAMUSCULAR; INTRAVENOUS ONCE
Status: COMPLETED | OUTPATIENT
Start: 2023-02-09 | End: 2023-02-09

## 2023-02-09 RX ORDER — ASPIRIN 81 MG/1
243 TABLET, CHEWABLE ORAL ONCE
Status: COMPLETED | OUTPATIENT
Start: 2023-02-10 | End: 2023-02-10

## 2023-02-09 RX ORDER — LORAZEPAM 2 MG/ML
0.5 INJECTION INTRAMUSCULAR
Status: DISCONTINUED | OUTPATIENT
Start: 2023-02-10 | End: 2023-02-10 | Stop reason: HOSPADM

## 2023-02-09 RX ORDER — POTASSIUM CHLORIDE 1500 MG/1
20 TABLET, EXTENDED RELEASE ORAL
Status: DISCONTINUED | OUTPATIENT
Start: 2023-02-10 | End: 2023-02-10 | Stop reason: HOSPADM

## 2023-02-09 RX ORDER — AMINOPHYLLINE 25 MG/ML
50-100 INJECTION, SOLUTION INTRAVENOUS
Status: DISCONTINUED | OUTPATIENT
Start: 2023-02-09 | End: 2023-02-09

## 2023-02-09 RX ORDER — AMLODIPINE BESYLATE 2.5 MG/1
2.5 TABLET ORAL DAILY
Status: DISCONTINUED | OUTPATIENT
Start: 2023-02-09 | End: 2023-02-12 | Stop reason: HOSPADM

## 2023-02-09 RX ORDER — CAFFEINE CITRATE 20 MG/ML
60 SOLUTION INTRAVENOUS
Status: DISCONTINUED | OUTPATIENT
Start: 2023-02-09 | End: 2023-02-09

## 2023-02-09 RX ORDER — ALBUTEROL SULFATE 90 UG/1
2 AEROSOL, METERED RESPIRATORY (INHALATION) EVERY 5 MIN PRN
Status: DISCONTINUED | OUTPATIENT
Start: 2023-02-09 | End: 2023-02-09

## 2023-02-09 RX ORDER — METOPROLOL SUCCINATE 100 MG/1
100 TABLET, EXTENDED RELEASE ORAL DAILY
Status: DISCONTINUED | OUTPATIENT
Start: 2023-02-09 | End: 2023-02-12 | Stop reason: HOSPADM

## 2023-02-09 RX ORDER — SODIUM CHLORIDE 9 MG/ML
INJECTION, SOLUTION INTRAVENOUS CONTINUOUS
Status: DISCONTINUED | OUTPATIENT
Start: 2023-02-10 | End: 2023-02-10 | Stop reason: HOSPADM

## 2023-02-09 RX ORDER — ACYCLOVIR 200 MG/1
10 CAPSULE ORAL ONCE
Status: DISCONTINUED | OUTPATIENT
Start: 2023-02-09 | End: 2023-02-12 | Stop reason: HOSPADM

## 2023-02-09 RX ORDER — HEPARIN SODIUM 10000 [USP'U]/100ML
0-5000 INJECTION, SOLUTION INTRAVENOUS CONTINUOUS
Status: DISCONTINUED | OUTPATIENT
Start: 2023-02-09 | End: 2023-02-10

## 2023-02-09 RX ADMIN — METOPROLOL SUCCINATE 100 MG: 100 TABLET, EXTENDED RELEASE ORAL at 15:36

## 2023-02-09 RX ADMIN — REGADENOSON 0.4 MG: 0.08 INJECTION, SOLUTION INTRAVENOUS at 10:19

## 2023-02-09 RX ADMIN — AMLODIPINE BESYLATE 2.5 MG: 2.5 TABLET ORAL at 17:01

## 2023-02-09 RX ADMIN — HYDRALAZINE HYDROCHLORIDE 10 MG: 20 INJECTION, SOLUTION INTRAMUSCULAR; INTRAVENOUS at 15:36

## 2023-02-09 RX ADMIN — SODIUM CHLORIDE: 9 INJECTION, SOLUTION INTRAVENOUS at 15:01

## 2023-02-09 RX ADMIN — ESCITALOPRAM OXALATE 10 MG: 10 TABLET, FILM COATED ORAL at 21:54

## 2023-02-09 RX ADMIN — ACETAMINOPHEN 650 MG: 325 TABLET, FILM COATED ORAL at 19:54

## 2023-02-09 RX ADMIN — SODIUM CHLORIDE: 9 INJECTION, SOLUTION INTRAVENOUS at 03:57

## 2023-02-09 RX ADMIN — TETROFOSMIN 11.5 MILLICURIE: 1.38 INJECTION, POWDER, LYOPHILIZED, FOR SOLUTION INTRAVENOUS at 08:10

## 2023-02-09 RX ADMIN — LISINOPRIL 40 MG: 40 TABLET ORAL at 07:49

## 2023-02-09 RX ADMIN — ASPIRIN 81 MG: 81 TABLET, COATED ORAL at 14:06

## 2023-02-09 RX ADMIN — HEPARIN SODIUM 1150 UNITS/HR: 10000 INJECTION, SOLUTION INTRAVENOUS at 17:08

## 2023-02-09 RX ADMIN — TETROFOSMIN 30.1 MILLICURIE: 1.38 INJECTION, POWDER, LYOPHILIZED, FOR SOLUTION INTRAVENOUS at 10:18

## 2023-02-09 RX ADMIN — HUMAN ALBUMIN MICROSPHERES AND PERFLUTREN 6 ML: 10; .22 INJECTION, SOLUTION INTRAVENOUS at 12:27

## 2023-02-09 RX ADMIN — ROSUVASTATIN CALCIUM 20 MG: 10 TABLET, FILM COATED ORAL at 21:54

## 2023-02-09 RX ADMIN — NITROGLYCERIN 10 MCG/MIN: 20 INJECTION INTRAVENOUS at 20:30

## 2023-02-09 ASSESSMENT — ACTIVITIES OF DAILY LIVING (ADL)
ADLS_ACUITY_SCORE: 31

## 2023-02-09 NOTE — CODE/RAPID RESPONSE
[facility]    RRT Note  2/9/2023   Time Called: 15:57     Code Status: Full Code    I was called to evaluate Maximus Harris Jr., who is a 58 year old male with a PMH significant for hypertension, hyperlipidemia, and anxiety who was admitted on 2/8/2023 for chest pain.    Assessment & Plan     Chest Pain suspect 2/2 vasodilatory response from hydralazine vs. ACS vs. Anxiety  Hypertensive Urgency  Prior to RRT  Patient received 10 mg PRN IV Hydralazine for /104, and subsequently developed chest pain. Patient did not have chest pain prior to hydralazine administration. Upon arrival patient non-toxic appearing, not in acute respiratory distress. Initial VS include /100, T 99.4, HR 92, RR 18, and SpO2 95% on RA. He is anxious. Patient reports after receiving hydralazine his vision became foggy, flushed, and he developed chest tightness, similar to the chest pain he's had this admission. Patient's chest pain resolved when I arrived. Patient denies any SOB, lightheadedness, dizziness, or palpitations.     Suspect chest pain was a result of hypoperfusion with large decrease in SBP after hydralazine. Patient was tachycardic in the 110-120s, which increased patients myocardial oxygen demand, likely resulting in chest pain. Chest pain resolved spontaneously.  Patient had Lexiscan stress test today in which he was symptomatic for shortness of breath, chest pressure, headache, flushing sensation after injection.  Echo shows mild concentric left ventricular hypertrophy, EF 45 to 50%, RV mildly dilated, normal RV systolic function, moderate pulmonary hypertension, and abnormal septal wall motion.    INTERVENTIONS:  - 12-lead EKG; Progression of ST segment elevation in V1-V6  - troponin T x2; initial 10, repeat scheduled for 1800  - heparin infusion  - Nitroglycerin infusion for SBP < 160 and chest pain free  - Discussed patient's new ST elevation in V1, and progression of ST elevation in  V2-V5 with Dr. Rubalcava with Cardiology. She reviewed EKGs. Recommended initiating patient on heparin drip. Discussed patient did not tolerate bolus dosing with hydralazine for BP control. She agrees with initiating nitroglycerin infusion. Although patient does have some noticeable ST changes concerning for ischemia, Dr. Rubalcava does not recommend taking patient to cath lab right now since patient is chest pain free and hemodynamically stable. Plan is for patient to have coronary angiogram.     Patient transferring to heart center. Discussed with and defer further cares to Hospitalist Dr. Araujo. Signout given to my colleague Uri Seo, who will follow up on troponin result.      Minor Silva NP  [facility]  Securely message with the Vocera Web Console (learn more here)  Text page via [employer] Paging/Directory          Physical Exam   Vital Signs with Ranges:  Temp:  [97.7  F (36.5  C)-99.4  F (37.4  C)] 99.4  F (37.4  C)  Pulse:  [] 92  Resp:  [8-24] 18  BP: (138-201)/() 167/100  SpO2:  [91 %-97 %] 95 %  I/O last 3 completed shifts:  In: 1000 [IV Piggyback:1000]  Out: -             Physical Exam   General:  Non-toxic appearing, not in acute distress.  Skin:  Warm, dry. No rashes or lesions on exposed skin.  HEENT:  Normocephalic, atraumatic; EOMs grossly intact.  Neck:  Supple. Trachea midline.  Chest:  Breath sounds CTA and no increased work of breathing on room air.  Cardiovascular:  RRR, no rub or murmur. No peripheral edema.  Abdomen:  Soft, non-tender, non-distended.  Musculoskeletal:  Moves all four extremities.   Neurological:  No focal neurological deficits.  Psychiatric:  Anxious.    Data     EKG:  Interpreted by Minor Silva NP  Time reviewed: 16:08 PM  Symptoms at time of EKG: Chest Pain   Rhythm: normal sinus, sinus tachycardia  Rate: 100  Axis: Left Axis Deviation  Ectopy: none  Conduction: left bundle branch block (complete)  ST Segments/ T Waves: No acute ischemic  changes  Q Waves: nonspecific  Comparison to prior: New ST elevation in V1, progression of ST elevation in V2-V5    Clinical Impression: myocardial ischemia    IMAGING: (X-ray/CT/MRI)   Recent Results (from the past 24 hour(s))   MR Brain w/o Contrast    Narrative    EXAM: MR BRAIN W/O CONTRAST  LOCATION: [facility]  DATE/TIME: 2/8/2023 7:19 PM    INDICATION: Left upper extremity and facial numbness, rule out acute CVA  COMPARISON: CT head 02/08/2023  TECHNIQUE: Routine multiplanar multisequence head MRI without intravenous contrast.    FINDINGS:  INTRACRANIAL CONTENTS: No acute or subacute infarct. No mass, acute hemorrhage, or extra-axial fluid collections. Normal brain parenchymal signal. Mild volume loss and presumed chronic small vessel ischemia.    SELLA: No abnormality accounting for technique.    OSSEOUS STRUCTURES/SOFT TISSUES: Normal marrow signal. The major intracranial vascular flow voids are maintained.     ORBITS: No abnormality accounting for technique.     SINUSES/MASTOIDS: No paranasal sinus mucosal disease. No middle ear or mastoid effusion.       Impression    IMPRESSION:  1.  No acute intracranial abnormality.    2.  Age-related change.   NM Lexiscan stress test (nuc card)   Result Value    Target     Baseline Systolic     Baseline Diastolic     Last Stress Systolic     Last Stress Diastolic     Baseline HR 96    Max HR  110    Max Predicted HR  67    Rate Pressure Product 20,240.0    Left Ventricular EF 53    Narrative       The nuclear stress test is probably abnormal.     There is a small area of nontransmural infarction in the anterior and   apical segment(s) of the left ventricle.     Left ventricular function is low normal.     The left ventricular ejection fraction at stress is 53%.     There is no prior study for comparison.     Echocardiogram Complete   Result Value    LVEF  45-50%    Biplane LVEF 48%    Narrative     [id number]  Critical access hospital  [id number]  [id number]^DANNIELLE^WILMAR     [facility]  Echocardiography Laboratory  [address] [place] [place] [address]     Name: WOODROW HOWELL JR.  MRN: [id number]  : 1965  Study Date: 2023 12:10 PM  Age: 58 yrs  Gender: Male  Patient Location: [facility]  Reason For Study: Chest Pain  Ordering Physician: WILMAR SPICER  Referring Physician: WILMAR SPICER  Performed By: Inder Rondon     BSA: 2.0 m2  Height: 69 in  Weight: 190 lb  HR: 84  ______________________________________________________________________________  Procedure  Complete Echo Adult. Optison (NDC #[id number]) given intravenously.  ______________________________________________________________________________  Interpretation Summary     There is mild concentric left ventricular hypertrophy.  Mildly decreased left ventricular systolic function  The visual ejection fraction is 45-50%.  Base-mid anteroseptal and inferoseptal hypokinesis.  The right ventricle is mildly dilated.  The right ventricular systolic function is normal.  Moderate (46-55mmHg) pulmonary hypertension is present.  No significant valve dysfunction.  The inferior vena cava was normal in size with preserved respiratory  variability.  There is no pericardial effusion.     Compared to resting stress echocardiogram images dated 10/29/2014, there is  abnormal septal motion and pulmonary hypertension as above.  ______________________________________________________________________________  Left Ventricle  The left ventricle is normal in size. There is mild concentric left  ventricular hypertrophy. The visual ejection fraction is 45-50%. Mildly  decreased left ventricular systolic function. Left ventricular diastolic  function is indeterminate. Biplane LVEF is 48%. Base-mid anteroseptal and  inferoseptal hypokinesis.     Right Ventricle  The right ventricle is mildly dilated. The right ventricular systolic function  is normal.      Atria  The left atrium is mildly dilated. Right atrial size is normal.     Mitral Valve  The mitral valve is normal in structure and function. There is mild (1+)  mitral regurgitation.     Tricuspid Valve  The tricuspid valve is normal in structure and function. The right ventricular  systolic pressure is approximated at 42mmHg plus the right atrial pressure.  Moderate (46-55mmHg) pulmonary hypertension is present. There is mild (1+)  tricuspid regurgitation.     Aortic Valve  There is trivial trileaflet aortic sclerosis.     Pulmonic Valve  The pulmonic valve is normal in structure and function.     Vessels  The aortic root is normal size. Normal size ascending aorta. The inferior vena  cava was normal in size with preserved respiratory variability.     Pericardium  There is no pericardial effusion.     ______________________________________________________________________________  MMode/2D Measurements & Calculations  IVSd: 1.1 cm  LVIDd: 5.4 cm  LVIDs: 3.7 cm  LVPWd: 0.95 cm  FS: 31.4 %     LV mass(C)d: 211.1 grams  LV mass(C)dI: 104.4 grams/m2  Ao root diam: 3.0 cm  asc Aorta Diam: 3.1 cm  LVOT diam: 2.0 cm  LVOT area: 3.1 cm2  LA Volume (BP): 88.0 ml  LA Volume Index (BP): 43.6 ml/m2  RWT: 0.35     Doppler Measurements & Calculations  MV E max gabriel: 84.0 cm/sec  MV A max gabriel: 58.3 cm/sec  MV E/A: 1.4     MV dec slope: 662.9 cm/sec2  MV dec time: 0.13 sec  Ao V2 max: 157.5 cm/sec  Ao max PG: 10.0 mmHg  ELLIOT(V,D): 2.2 cm2  LV V1 max P.8 mmHg  LV V1 max: 109.7 cm/sec  LV V1 VTI: 20.0 cm  SV(LVOT): 61.8 ml  SI(LVOT): 30.6 ml/m2  PA V2 max: 176.5 cm/sec  PA max P.5 mmHg  PA acc time: 0.10 sec  TR max gabriel: 327.1 cm/sec  TR max P.8 mmHg  AV Gabriel Ratio (DI): 0.70  Lateral E/e': 8.0     ______________________________________________________________________________  Report approved by: Moy Andrade 2023 01:09 PM             CBC with Diff:  Recent Labs   Lab Test 23  1612   WBC 10.3    HGB 15.0   MCV 85         No results found for: RETICABSCT  No results found for: RETP    Comprehensive Metabolic Panel:  Recent Labs   Lab 02/09/23  1603 02/08/23  1316   NA  --  137   POTASSIUM  --  3.5   CHLORIDE  --  98   CO2  --  27   ANIONGAP  --  12   * 150*   BUN  --  9.9   CR  --  0.88   GFRESTIMATED  --  >90   DAVID  --  9.3   PROTTOTAL  --  7.2   ALBUMIN  --  4.6   BILITOTAL  --  0.6   ALKPHOS  --  52   AST  --  32   ALT  --  23         Time Spent on this Encounter     CRITICAL CARE TIME  I spent 45 minutes (1600 - 1645) of critical care time on the unit/floor managing the care of Maximus Harris JrLeandra. Upon evaluation, this patient had a high probability of imminent or life-threatening deterioration due to chest pain concerning for myocardial infarction which required my direct attention, intervention, and personal management. 100% of my time was spent at the bedside counseling the patient and/or coordinating care regarding services listed in this note.

## 2023-02-09 NOTE — PLAN OF CARE
Orientation/Cognitive: A&Ox4, anxoius  Observation Goals (Met/ Not Met): Not Met  Mobility Level/Assist Equipment: Independent  Fall Risk (Y/N): No  Behavior Concerns: None  Pain Management: C/o chest pain 1/10, describes as slight twinges to both right and left sides of chest that come and go and do not last long.  Tele/VS/O2: BP elevated 150/90s, all other VSS, on RA. Tele- SR w/ BBB.  ABNL Lab/BG: Trop 9, 17, 12, 14  Diet: NPO  Bowel/Bladder: Continent  Skin Concerns: none  Drains/Devices: PIV infusing NS at 125/hr.  Tests/Procedures for next shift: Lexiscan and Echo  Anticipated DC date & active delays: Possible discharge today pending cardiac work up.  Patient Stated Goal for Today: Rest      Observation goals  PRIOR TO DISCHARGE        Comments: List all goals to be met before discharge home:   - Serial troponins and stress test complete.- NOT MET   - Seen and cleared by consultant if applicable-NOT MET   - Adequate pain control on oral analgesia - MET  - Vital signs normal or at patient baseline - Partially MET, slight HTN  - Safe disposition plan has been identified- MET   - Nurse to notify provider when observation goals have been met and patient is ready for discharge.

## 2023-02-09 NOTE — PROGRESS NOTES
Observation goals  PRIOR TO DISCHARGE       Comments: List all goals to be met before discharge home:      - Serial troponins and stress test complete: Partially met     - Seen and cleared by consultant if applicable :Not met     - Adequate pain control on oral analgesia : Met     - Vital signs normal or at patient baseline: Partially met, slightly elevated BP      - Safe disposition plan has been identified: Met     - Nurse to notify provider when observation goals have been met and patient is ready for discharge.

## 2023-02-09 NOTE — PROGRESS NOTES
Orientation/Cognitive: A/OX4.  Observation Goals (Met/ Not Met):Not met  Mobility Level/Assist Equipment: Ind  Fall Risk (Y/N): No  Behavior Concerns: None  Pain Management: C/O R neck soreness, declining intervention at the moment..   Tele/VS/O2: Slightly elevated BP, other VSS on RA.Tele: SR w/ BBB  ABNL Lab/BG: WNL  Diet: NPO  Bowel/Bladder: Continent.  Skin Concerns: WNL  Drains/Devices: PIV Infusing IVF as ordered.   Tests/Procedures for next shift: None  Anticipated DC date & active delays: TBD  Patient Stated Goal for Today: Sleep.

## 2023-02-09 NOTE — PROGRESS NOTES
Observation goals  PRIOR TO DISCHARGE       Comments: List all goals to be met before discharge home:     - Serial troponins and stress test complete: Not met     - Seen and cleared by consultant if applicable : Not met    - Adequate pain control on oral analgesia : Met    - Vital signs normal or at patient baseline: Partially met, slightly elevated BP     - Safe disposition plan has been identified: Met    - Nurse to notify provider when observation goals have been met and patient is ready for discharge.

## 2023-02-09 NOTE — PROGRESS NOTES
Observation goals  PRIOR TO DISCHARGE        Comments: List all goals to be met before discharge home:   - Serial troponins and stress test complete.- NOT MET   - Seen and cleared by consultant if applicable-NOT MET   - Adequate pain control on oral analgesia - MET  - Vital signs normal or at patient baseline - Partially MET, slight HTN  - Safe disposition plan has been identified- MET   - Nurse to notify provider when observation goals have been met and patient is ready for discharge.

## 2023-02-09 NOTE — PROGRESS NOTES
Lexiscan Stress:    Pt stated he is anxious.    SBP>190 and DBP>100 prior to Lexiscan.    HR >120's, SBP> 200 and DBP> 100 during the test.   SBP ~ 160 and DBP ~ 100 post test.  HR ~90   Monitor shows SR to ST with Left BBB.    Pt denied chest pain or pressure prior to injection. After injection Lexiscan, pt reports some SOB, chest pressure, HA, flushing sensation.  SX resolved post test.     1035   Pt transferred back to Rm [id number] per w/c. Detailed report called to Boris, bedside RN.

## 2023-02-09 NOTE — CONSULTS
[facility]  Cardiology Consultation     Date of Admission:  2023  Date of Consult (When I saw the patient): 23  Reason for Consult: Abnormal cardiovascular stress test     Primary Cardiologist: no prior cardiology care    History of Present Illness   Maximus Harris Jr. is a 58 year old male who presents with 2-3 week history of Right arm/shoulder discomfort associated with left arm tingling and diaphoresis. He has strong family history of hypertension and premature CAD (mother  from complications of angioplasty in her mid 50's; father  from MI in his 40's; older brother had MI in the early 40's). Patient reports he has been vegetarian for close to 10 years. He does cardio exercise three times a week. He denies any history of tobacco or alcohol use.     He also has history of hypertension (uncontrolled), mixed hyperlipidemia (on rosuvastatin 20 mg daily with LDL of 71 and HLD of 40 from 2014), probable sleep apnea, and anxiety.     Maximus describes he has been experiencing right arm and shoulder discomfort over the last several weeks. He initially thought he hurt his shoulder while exercising on his elliptical but his symptoms progressed despite him not working out anymore and he had diaphoresis overnight. He went into his office to work yesterday his symptoms returned with increased severity prompting him to be seen in the ED.     In the ED, his ECG showed LBBB, unclear if this is new. His case was discussed with cardiology service and cath lab was not activated. BMP and CBC were unremarkable. Initial troponin was normal. Chest CT ordered and this showed mild coronary calcification. CXR was unremarkable. His blood pressure was markedly elevated with systolic BP between 190-200.     Echocardiogram was ordered and this showed reduced LVEF 45-50% with base-mid anteroseptal/inferoseptal hypokinesis. Lexiscan study was ordered by the primary service which showed small  "area of infarction in the anterior/apical region. Cardiology was consulted for further recommendations.     -------------------------------------------------------------------------------------------    Assessment:  Maximus Harris Jr. is a 58 year old male who was admitted on 2/8/2023 with chest/arm pain with associated diaphoresis.     # Unstable Angina   -- abnormal lexiscan study and echo with reduced LVEF and WMA   -- ECG with LBBB (unknown if new)  -- Patient has cardiovascular risk factors of hypertension, hyperlipidemia, and family history     # Hypertensive Urgency   -- on pta lisinopril 40 mg currently   -- pta metoprolol succinate 100 mg was held today for stress study   -- BP continues to be suboptimal     # Mixed Hyperlipidemia   -- on rosuvastatin 20 mg with LDL of 71 and HDL of 40    # Strong Family Hx of premature CAD     # Anxiety       Clinically Significant Risk Factors Present on Admission                # Obesity: Estimated body mass index is 30.29 kg/m  as calculated from the following:    Height as of this encounter: 1.765 m (5' 9.5\").    Weight as of this encounter: 94.4 kg (208 lb 1.8 oz).    -------------------------------------------------------------------------------------------    Plan:  -- Plan for left heart catheterization with possible PCI tomorrow (2/10)- risk and benefits discussed and consent obtained   -- Initiate heparin   -- Resume metoprolol succinate 100 mg daily   -- Give IV hydralazine 10 mg x1   -- Start amlodipine 2.5 mg daily   -- Repeat lipid panel/ALT tomorrow (2/10)  -- NPO after midnight     -------------------------------------------------------------------------------------------    Code Status    Full Code    Past Medical History   Past Medical History:   Diagnosis Date     HTN (hypertension)      Hyperlipidemia LDL goal < 130      Nephrolithiasis        Past Surgical History   Past Surgical History:   Procedure Laterality Date     HERNIA REPAIR, INGUINAL RT/LT "         Prior to Admission Medications   Prior to Admission Medications   Prescriptions Last Dose Informant Patient Reported? Taking?   Vitamin D3 (CHOLECALCIFEROL) 125 MCG (5000 UT) tablet 2/7/2023 at 1200 Self Yes Yes   Sig: Take 125 mcg by mouth daily   aspirin 81 MG tablet 2/7/2023 at 2230 Self No Yes   Sig: Take 1 tablet (81 mg) by mouth daily   cyanocobalamin (VITAMIN B-12) 500 MCG tablet 2/7/2023 at 1200 Self Yes Yes   Sig: Take 500 mcg by mouth daily   escitalopram (LEXAPRO) 10 MG tablet 2/7/2023 at 2230 Self Yes Yes   Sig: Take 10 mg by mouth daily   fexofenadine (ALLEGRA) 180 MG tablet 2/7/2023 at 1200 Self Yes Yes   Sig: Take 180 mg by mouth daily   lisinopril (ZESTRIL) 40 MG tablet 2/7/2023 at 1200 Self Yes Yes   Sig: Take 40 mg by mouth daily   metoprolol succinate ER (TOPROL XL) 100 MG 24 hr tablet 2/7/2023 at 1200 Self Yes Yes   Sig: Take 100 mg by mouth daily   rosuvastatin (CRESTOR) 20 MG tablet 2/7/2023 at 2230 Self Yes Yes   Sig: Take 20 mg by mouth daily   vitamin C (ASCORBIC ACID) 1000 MG TABS 2/7/2023 at 1200 Self Yes Yes   Sig: Take 1,000 mg by mouth daily   vitamin E 400 units TABS 2/7/2023 at 1200 Self Yes Yes   Sig: Take 400 Units by mouth daily      Facility-Administered Medications: None     Allergies   Allergies   Allergen Reactions     Penicillin G Rash       Social History    Maximus Harris Jr.  reports that he has never smoked. He does not have any smokeless tobacco history on file. He reports that he does not drink alcohol and does not use drugs.    Family History   Family History   Problem Relation Age of Onset     Heart Disease Mother         MI     Heart Disease Father         MI, COPD     Arthritis Brother         lupus     Cancer Sister         ovarian cancer DM     Diabetes Sister        --------------------------------------------------------------------------------------------    Review of Systems   The 10 point Review of Systems is negative other than noted in the HPI or  here.     Temp:  [97.7  F (36.5  C)-98.3  F (36.8  C)] 98.3  F (36.8  C)  Pulse:  [] 78  Resp:  [8-31] 18  BP: (138-201)/() 153/89  SpO2:  [91 %-97 %] 96 %  208 lbs 1.83 oz    Constitutional: Overweight. Spouse with him. Patient is tearful at times. Comfortable at rest. Cooperative, alert and oriented.  Eyes: Pupils equal and round, conjunctivae and lids unremarkable, sclera white, no xanthalasma.   ENT: Satisfactory dentition.  No cyanosis or pallor.  Respiratory: Clear to auscultation bilaterally.   Cardiovascular: RRR without murmur, rub, or gallop.  GI: Soft, nontender, no hepatosplenomegaly, no masses, active bowel sounds.    Skin: No rash, erythema, ecchymosis.  Musculoskeletal: Normal muscle tone and strength.   Neuropsychiatric: Oriented to time place and person.  Affect normal.  No gross motor deficits.  Extremities: No pitting edema.   Vascular: 2+ radial and DP pulses.     Data   Reviewed.     Awais Davila PA-C   2/9/2023  Pager: [phone]

## 2023-02-09 NOTE — PROGRESS NOTES
RECEIVING UNIT ED HANDOFF REVIEW    ED Nurse Handoff Report was reviewed by: Boris Sanchez RN on February 8, 2023 at 7:11 PM

## 2023-02-10 LAB
ACT BLD: 135 SECONDS (ref 74–150)
ACT BLD: 229 SECONDS (ref 74–150)
ANION GAP SERPL CALCULATED.3IONS-SCNC: 13 MMOL/L (ref 7–15)
BUN SERPL-MCNC: 14.2 MG/DL (ref 6–20)
CALCIUM SERPL-MCNC: 10 MG/DL (ref 8.6–10)
CHLORIDE SERPL-SCNC: 100 MMOL/L (ref 98–107)
CREAT SERPL-MCNC: 0.82 MG/DL (ref 0.67–1.17)
DEPRECATED HCO3 PLAS-SCNC: 26 MMOL/L (ref 22–29)
GFR SERPL CREATININE-BSD FRML MDRD: >90 ML/MIN/1.73M2
GLUCOSE BLDC GLUCOMTR-MCNC: 86 MG/DL (ref 70–99)
GLUCOSE SERPL-MCNC: 84 MG/DL (ref 70–99)
HBA1C MFR BLD: 7.2 %
MAGNESIUM SERPL-MCNC: 1.9 MG/DL (ref 1.7–2.3)
POTASSIUM SERPL-SCNC: 3.5 MMOL/L (ref 3.4–5.3)
SODIUM SERPL-SCNC: 139 MMOL/L (ref 136–145)
UFH PPP CHRO-ACNC: 0.17 IU/ML
UFH PPP CHRO-ACNC: 0.37 IU/ML

## 2023-02-10 PROCEDURE — 250N000011 HC RX IP 250 OP 636

## 2023-02-10 PROCEDURE — 250N000013 HC RX MED GY IP 250 OP 250 PS 637: Performed by: STUDENT IN AN ORGANIZED HEALTH CARE EDUCATION/TRAINING PROGRAM

## 2023-02-10 PROCEDURE — 99214 OFFICE O/P EST MOD 30 MIN: CPT | Mod: 25 | Performed by: INTERNAL MEDICINE

## 2023-02-10 PROCEDURE — C1769 GUIDE WIRE: HCPCS | Performed by: INTERNAL MEDICINE

## 2023-02-10 PROCEDURE — C9600 PERC DRUG-EL COR STENT SING: HCPCS | Performed by: INTERNAL MEDICINE

## 2023-02-10 PROCEDURE — 36415 COLL VENOUS BLD VENIPUNCTURE: CPT

## 2023-02-10 PROCEDURE — 96375 TX/PRO/DX INJ NEW DRUG ADDON: CPT

## 2023-02-10 PROCEDURE — C1760 CLOSURE DEV, VASC: HCPCS

## 2023-02-10 PROCEDURE — 250N000009 HC RX 250: Performed by: INTERNAL MEDICINE

## 2023-02-10 PROCEDURE — 99152 MOD SED SAME PHYS/QHP 5/>YRS: CPT | Mod: GC | Performed by: INTERNAL MEDICINE

## 2023-02-10 PROCEDURE — 250N000011 HC RX IP 250 OP 636: Performed by: INTERNAL MEDICINE

## 2023-02-10 PROCEDURE — 80048 BASIC METABOLIC PNL TOTAL CA: CPT

## 2023-02-10 PROCEDURE — 99152 MOD SED SAME PHYS/QHP 5/>YRS: CPT | Performed by: INTERNAL MEDICINE

## 2023-02-10 PROCEDURE — 85347 COAGULATION TIME ACTIVATED: CPT | Mod: 91

## 2023-02-10 PROCEDURE — 92928 PRQ TCAT PLMT NTRAC ST 1 LES: CPT | Mod: LD | Performed by: INTERNAL MEDICINE

## 2023-02-10 PROCEDURE — 272N000001 HC OR GENERAL SUPPLY STERILE: Performed by: INTERNAL MEDICINE

## 2023-02-10 PROCEDURE — 250N000011 HC RX IP 250 OP 636: Performed by: PHYSICIAN ASSISTANT

## 2023-02-10 PROCEDURE — C1725 CATH, TRANSLUMIN NON-LASER: HCPCS | Performed by: INTERNAL MEDICINE

## 2023-02-10 PROCEDURE — 36415 COLL VENOUS BLD VENIPUNCTURE: CPT | Performed by: STUDENT IN AN ORGANIZED HEALTH CARE EDUCATION/TRAINING PROGRAM

## 2023-02-10 PROCEDURE — 93454 CORONARY ARTERY ANGIO S&I: CPT | Performed by: INTERNAL MEDICINE

## 2023-02-10 PROCEDURE — 250N000013 HC RX MED GY IP 250 OP 250 PS 637: Performed by: INTERNAL MEDICINE

## 2023-02-10 PROCEDURE — 83735 ASSAY OF MAGNESIUM: CPT

## 2023-02-10 PROCEDURE — C1887 CATHETER, GUIDING: HCPCS | Performed by: INTERNAL MEDICINE

## 2023-02-10 PROCEDURE — 96366 THER/PROPH/DIAG IV INF ADDON: CPT

## 2023-02-10 PROCEDURE — 93454 CORONARY ARTERY ANGIO S&I: CPT | Mod: 26 | Performed by: INTERNAL MEDICINE

## 2023-02-10 PROCEDURE — 82962 GLUCOSE BLOOD TEST: CPT

## 2023-02-10 PROCEDURE — G0378 HOSPITAL OBSERVATION PER HR: HCPCS

## 2023-02-10 PROCEDURE — C1874 STENT, COATED/COV W/DEL SYS: HCPCS | Performed by: INTERNAL MEDICINE

## 2023-02-10 PROCEDURE — 93005 ELECTROCARDIOGRAM TRACING: CPT | Mod: XU

## 2023-02-10 PROCEDURE — 84439 ASSAY OF FREE THYROXINE: CPT | Performed by: STUDENT IN AN ORGANIZED HEALTH CARE EDUCATION/TRAINING PROGRAM

## 2023-02-10 PROCEDURE — 250N000013 HC RX MED GY IP 250 OP 250 PS 637: Performed by: PHYSICIAN ASSISTANT

## 2023-02-10 PROCEDURE — 250N000009 HC RX 250: Performed by: STUDENT IN AN ORGANIZED HEALTH CARE EDUCATION/TRAINING PROGRAM

## 2023-02-10 PROCEDURE — C1760 CLOSURE DEV, VASC: HCPCS | Performed by: INTERNAL MEDICINE

## 2023-02-10 PROCEDURE — 96376 TX/PRO/DX INJ SAME DRUG ADON: CPT

## 2023-02-10 PROCEDURE — 84443 ASSAY THYROID STIM HORMONE: CPT | Performed by: STUDENT IN AN ORGANIZED HEALTH CARE EDUCATION/TRAINING PROGRAM

## 2023-02-10 PROCEDURE — 99153 MOD SED SAME PHYS/QHP EA: CPT | Performed by: INTERNAL MEDICINE

## 2023-02-10 PROCEDURE — 99291 CRITICAL CARE FIRST HOUR: CPT

## 2023-02-10 PROCEDURE — 93010 ELECTROCARDIOGRAM REPORT: CPT | Mod: 76 | Performed by: INTERNAL MEDICINE

## 2023-02-10 PROCEDURE — 85520 HEPARIN ASSAY: CPT | Performed by: STUDENT IN AN ORGANIZED HEALTH CARE EDUCATION/TRAINING PROGRAM

## 2023-02-10 PROCEDURE — 99232 SBSQ HOSP IP/OBS MODERATE 35: CPT | Performed by: STUDENT IN AN ORGANIZED HEALTH CARE EDUCATION/TRAINING PROGRAM

## 2023-02-10 PROCEDURE — C1894 INTRO/SHEATH, NON-LASER: HCPCS | Performed by: INTERNAL MEDICINE

## 2023-02-10 DEVICE — CLOSURE ANGIOSEAL 6FR 610130: Type: IMPLANTABLE DEVICE | Status: FUNCTIONAL

## 2023-02-10 DEVICE — STENT COR ONYX FRONTIER 15X3.50MM ONYXNG35015UX: Type: IMPLANTABLE DEVICE | Status: FUNCTIONAL

## 2023-02-10 RX ORDER — NITROGLYCERIN 5 MG/ML
VIAL (ML) INTRAVENOUS
Status: DISCONTINUED | OUTPATIENT
Start: 2023-02-10 | End: 2023-02-10 | Stop reason: HOSPADM

## 2023-02-10 RX ORDER — NALOXONE HYDROCHLORIDE 0.4 MG/ML
0.2 INJECTION, SOLUTION INTRAMUSCULAR; INTRAVENOUS; SUBCUTANEOUS
Status: ACTIVE | OUTPATIENT
Start: 2023-02-10 | End: 2023-02-10

## 2023-02-10 RX ORDER — NITROGLYCERIN 0.4 MG/1
0.4 TABLET SUBLINGUAL EVERY 5 MIN PRN
Status: DISCONTINUED | OUTPATIENT
Start: 2023-02-10 | End: 2023-02-10

## 2023-02-10 RX ORDER — FLUMAZENIL 0.1 MG/ML
0.2 INJECTION, SOLUTION INTRAVENOUS
Status: ACTIVE | OUTPATIENT
Start: 2023-02-10 | End: 2023-02-10

## 2023-02-10 RX ORDER — NALOXONE HYDROCHLORIDE 0.4 MG/ML
0.4 INJECTION, SOLUTION INTRAMUSCULAR; INTRAVENOUS; SUBCUTANEOUS
Status: ACTIVE | OUTPATIENT
Start: 2023-02-10 | End: 2023-02-10

## 2023-02-10 RX ORDER — LORAZEPAM 2 MG/ML
0.5 INJECTION INTRAMUSCULAR ONCE
Status: COMPLETED | OUTPATIENT
Start: 2023-02-10 | End: 2023-02-10

## 2023-02-10 RX ORDER — ATROPINE SULFATE 0.1 MG/ML
0.5 INJECTION INTRAVENOUS
Status: ACTIVE | OUTPATIENT
Start: 2023-02-10 | End: 2023-02-10

## 2023-02-10 RX ORDER — ONDANSETRON 2 MG/ML
4 INJECTION INTRAMUSCULAR; INTRAVENOUS EVERY 6 HOURS PRN
Status: DISCONTINUED | OUTPATIENT
Start: 2023-02-10 | End: 2023-02-11

## 2023-02-10 RX ORDER — ACETAMINOPHEN 325 MG/1
650 TABLET ORAL EVERY 4 HOURS PRN
Status: DISCONTINUED | OUTPATIENT
Start: 2023-02-10 | End: 2023-02-10

## 2023-02-10 RX ORDER — ROSUVASTATIN CALCIUM 10 MG/1
40 TABLET, COATED ORAL AT BEDTIME
Status: DISCONTINUED | OUTPATIENT
Start: 2023-02-10 | End: 2023-02-12 | Stop reason: HOSPADM

## 2023-02-10 RX ORDER — SODIUM CHLORIDE 9 MG/ML
INJECTION, SOLUTION INTRAVENOUS CONTINUOUS
Status: ACTIVE | OUTPATIENT
Start: 2023-02-10 | End: 2023-02-10

## 2023-02-10 RX ORDER — HEPARIN SODIUM 1000 [USP'U]/ML
INJECTION, SOLUTION INTRAVENOUS; SUBCUTANEOUS
Status: DISCONTINUED | OUTPATIENT
Start: 2023-02-10 | End: 2023-02-10 | Stop reason: HOSPADM

## 2023-02-10 RX ORDER — FENTANYL CITRATE 50 UG/ML
INJECTION, SOLUTION INTRAMUSCULAR; INTRAVENOUS
Status: DISCONTINUED | OUTPATIENT
Start: 2023-02-10 | End: 2023-02-10 | Stop reason: HOSPADM

## 2023-02-10 RX ORDER — HYDRALAZINE HYDROCHLORIDE 20 MG/ML
10 INJECTION INTRAMUSCULAR; INTRAVENOUS EVERY 4 HOURS PRN
Status: DISCONTINUED | OUTPATIENT
Start: 2023-02-10 | End: 2023-02-12 | Stop reason: HOSPADM

## 2023-02-10 RX ORDER — METOPROLOL TARTRATE 1 MG/ML
5 INJECTION, SOLUTION INTRAVENOUS
Status: DISCONTINUED | OUTPATIENT
Start: 2023-02-10 | End: 2023-02-12 | Stop reason: HOSPADM

## 2023-02-10 RX ORDER — FENTANYL CITRATE 50 UG/ML
25 INJECTION, SOLUTION INTRAMUSCULAR; INTRAVENOUS
Status: DISCONTINUED | OUTPATIENT
Start: 2023-02-10 | End: 2023-02-12 | Stop reason: HOSPADM

## 2023-02-10 RX ORDER — ONDANSETRON 4 MG/1
4 TABLET, ORALLY DISINTEGRATING ORAL EVERY 6 HOURS PRN
Status: DISCONTINUED | OUTPATIENT
Start: 2023-02-10 | End: 2023-02-11

## 2023-02-10 RX ORDER — HEPARIN SODIUM 10000 [USP'U]/100ML
100-1000 INJECTION, SOLUTION INTRAVENOUS CONTINUOUS PRN
Status: DISCONTINUED | OUTPATIENT
Start: 2023-02-10 | End: 2023-02-10 | Stop reason: HOSPADM

## 2023-02-10 RX ADMIN — HEPARIN SODIUM 1300 UNITS/HR: 10000 INJECTION, SOLUTION INTRAVENOUS at 04:44

## 2023-02-10 RX ADMIN — METOPROLOL TARTRATE 2.5 MG: 5 INJECTION INTRAVENOUS at 22:54

## 2023-02-10 RX ADMIN — LORAZEPAM 0.5 MG: 0.5 TABLET ORAL at 09:31

## 2023-02-10 RX ADMIN — ASPIRIN 325 MG ORAL TABLET 325 MG: 325 PILL ORAL at 09:31

## 2023-02-10 RX ADMIN — AMLODIPINE BESYLATE 2.5 MG: 2.5 TABLET ORAL at 09:32

## 2023-02-10 RX ADMIN — LISINOPRIL 40 MG: 40 TABLET ORAL at 09:31

## 2023-02-10 RX ADMIN — TICAGRELOR 90 MG: 90 TABLET ORAL at 21:22

## 2023-02-10 RX ADMIN — ROSUVASTATIN CALCIUM 40 MG: 10 TABLET, FILM COATED ORAL at 21:22

## 2023-02-10 RX ADMIN — LORAZEPAM 0.5 MG: 2 INJECTION INTRAMUSCULAR; INTRAVENOUS at 23:11

## 2023-02-10 RX ADMIN — METOPROLOL SUCCINATE 100 MG: 100 TABLET, EXTENDED RELEASE ORAL at 17:41

## 2023-02-10 RX ADMIN — ESCITALOPRAM OXALATE 10 MG: 10 TABLET, FILM COATED ORAL at 21:22

## 2023-02-10 ASSESSMENT — ACTIVITIES OF DAILY LIVING (ADL)
ADLS_ACUITY_SCORE: 31

## 2023-02-10 NOTE — UTILIZATION REVIEW
"Admission Status; Secondary Review Determination     Admission Date: 2/8/2023 12:54 PM       Under the authority of the Utilization Management Committee, the utilization review process indicated a secondary review on the above patient.  The review outcome is based on review of the medical records, discussions with staff, and applying clinical experience noted on the date of the review.          (x) Observation Status Appropriate - This patient does not meet hospital inpatient criteria and is placed in observation status. If this patient's primary payer is Medicare and was admitted as an inpatient, Condition Code 44 should be used and patient status changed to \"observation\".       RATIONALE FOR DETERMINATION      Brief clinical presentation, information copied from the chart, abbreviated and edited for relevant content:     Patient is stable and planning for discharge tomorrow.     Patient is a 58-year-old male with a past medical history of hypertension, hyperlipidemia, anxiety, coming to the ER with chest pain. Had abnormal stress test. Sent to cath lab for unstable angina. Patient's EKG showed a left bundle branch block with unknown chronicity.  Cardiology consulted. Rapid response was called on 2/9/23 for hypertensive urgency and chest pain and patient was transferred to the cardiac care unit and was started on heparin drip, nitroglycerin drip and his troponins were negative. On cath today, noted Single-vessel occlusive coronary artery disease with ulcerated proximal LAD lesion, S/P  Successful PCI of proximal to mid LAD with placement of a 3.5 x 15 mm resolute Ed stent.              The severity of illness, intensity of cares provided, risk for adverse outcome, and expected LOS make the care appropriate for observation.       The information on this document is developed by the utilization review team in order for the business office to ensure compliance.  This only denotes the appropriateness of proper " admission status and does not reflect the quality of care rendered.         The definitions of Inpatient Status and Observation Status used in making the determination above are those provided in the CMS Coverage Manual, Chapter 1 and Chapter 6, section 70.4.      Sincerely,     Shiloh Baez MD   Utilization Review/ Case Management  [facility].

## 2023-02-10 NOTE — PROGRESS NOTES
Cardiology Progress Note  [facility], [facility]           Assessment and Plan:   #1 Unstable angina, S/P LAD stent 2/10/23  #2 Hypertension  #3 Hyperlipidemia  #4 Anxiety  #5 Obesity  #6 Mild cardiomyopathy with 45%  #7 Probable FELICITA  #8 Probable OM II lesion    It was a pleasure to be involved in the care of Mr. Harris. He is now S/P LAD stent. Now on Brilinta and ASA.  Will increase Crestor to 40mg daily. BP now controlled.   Will need outpatient screen for FELICITA. Will add on A1C.  Will need cardiac rehab at discharge. Mild cardiomyopathy hopefully stunning that will improve now that he is revascularized. On BB and ACE.      OM II has a liekly lesion that was not well visualized - if he has residual pain this should be considered as the culprit.    Anticipate dismissal tomorrow. Outpatient follow up will be arranged.     Please call with questions.     BAUTISTA Rubalcava MD                Interval History:   Cath completed and had single stent placed to the LAD               Medications:       amLODIPine  2.5 mg Oral Daily     aspirin  81 mg Oral Daily     escitalopram  10 mg Oral At Bedtime     lisinopril  40 mg Oral Daily     metoprolol succinate ER  100 mg Oral Daily     rosuvastatin  20 mg Oral At Bedtime     sodium chloride bacteriostatic  10 mL Intravenous Once     ticagrelor  90 mg Oral Q12H     sodium chloride 0.9%, acetaminophen **OR** acetaminophen, alum & mag hydroxide-simethicone, atropine, - MEDICATION INSTRUCTIONS -, - MEDICATION INSTRUCTIONS -, fentaNYL, flumazenil, HOLD MEDICATION, hydrALAZINE, metoprolol, midazolam, naloxone **OR** naloxone **OR** naloxone **OR** naloxone, nitroGLYcerin, ondansetron **OR** ondansetron, Percutaneous Coronary Intervention orders placed (this is information for BPA alerting), ASPIRIN NOT PRESCRIBED               Physical Exam:   Blood pressure 120/74, pulse 69, temperature 98.1  F (36.7  C), temperature source Oral, resp. rate 10, height  "1.765 m (5' 9.5\"), weight 95.2 kg (209 lb 12.8 oz), SpO2 (!) 88 %.  Wt Readings from Last 4 Encounters:   02/10/23 95.2 kg (209 lb 12.8 oz)   10/28/14 81.6 kg (180 lb)   14 89.1 kg (196 lb 8 oz)         Vital Sign Ranges  Temperature Temp  Av.8  F (37.1  C)  Min: 98.1  F (36.7  C)  Max: 99.4  F (37.4  C)   Blood pressure Systolic (24hrs), Av , Min:112 , Max:195        Diastolic (24hrs), Av, Min:66, Max:107      Pulse Pulse  Av.4  Min: 65  Max: 98   Respirations Resp  Av.9  Min: 6  Max: 28   Pulse oximetry SpO2  Av.4 %  Min: 88 %  Max: 96 %         Intake/Output Summary (Last 24 hours) at 2/10/2023 1508  Last data filed at 2/10/2023 1000  Gross per 24 hour   Intake 3562.5 ml   Output --   Net 3562.5 ml       Constitutional: Awake, alert, cooperative, anxious   Lungs: Clear to auscultation bilaterally, no crackles or wheezing   Cardiovascular: Regular rate and rhythm, normal S1 and S2, and no murmur noted   Abdomen: Normal bowel sounds, soft, non-distended, non-tender   Skin: No rashes, no cyanosis, no edema   Other:           Data:     Recent Labs   Lab Test 23  1612 23  1316   WBC 10.3 8.3   HGB 15.0 14.7   MCV 85 86    177      Recent Labs   Lab Test 23  1603 23  1316   NA  --  137   POTASSIUM  --  3.5   CHLORIDE  --  98   CO2  --  27   BUN  --  9.9   CR  --  0.88   ANIONGAP  --  12   DAVID  --  9.3   * 150*         [place] Rubalcava MD  "

## 2023-02-10 NOTE — PRE-PROCEDURE
GENERAL PRE-PROCEDURE:   Procedure:  Coronary angiogram  Date/Time:  2/10/2023 12:30 PM    Verbal consent obtained?: Yes    Written consent obtained?: Yes    Risks and benefits: Risks, benefits and alternatives were discussed    Consent given by:  Patient  Patient states understanding of procedure being performed: Yes    Patient's understanding of procedure matches consent: Yes    Procedure consent matches procedure scheduled: Yes    Expected level of sedation:  Moderate  Appropriately NPO:  Yes  ASA Class:  3  Mallampati  :  Grade 3- soft palate visible, posterior pharyngeal wall not visible  Lungs:  Lungs clear with good breath sounds bilaterally  Heart:  Normal heart sounds and rate  History & Physical reviewed:  History and physical reviewed and no updates needed  Statement of review:  I have reviewed the lab findings, diagnostic data, medications, and the plan for sedation

## 2023-02-10 NOTE — PROGRESS NOTES
House SHANTELL brief RRT follow up:    Was asked by colleague, ALTA Orantes, CNP, to follow up on repeat troponin; please refer to Ricardo's initial RRT note for further details.  Continue with previously outlined plan of care per hospitalist and cardiology.     Latest Reference Range & Units 02/08/23 13:16 02/08/23 16:14 02/08/23 20:34 02/08/23 22:56 02/09/23 16:11 02/09/23 18:46   Troponin T, High Sensitivity <=22 ng/L 9 17 12 14 10 11     ALTA Puga, CNP  Hospitalist-House SHANTELL  Hospitalist Service  Securely message with [employer] (more info)  Text page via [facility] Paging/Directory     No charge.

## 2023-02-10 NOTE — PROGRESS NOTES
[facility]    Medicine Progress Note - Hospitalist Service    Date of Admission:  2/8/2023    Assessment & Plan   Patient is a 58-year-old male with a past medical history of hypertension, hyperlipidemia, anxiety, coming to the ER with chest pain.  #Chest pain  Abnormal stress test  #Unstable angina.  Patient is EKG showed a left bundle branch block with unknown chronicity.  Previous stress echo from 2014 did show a left bundle branch block with exercise.  #Last stress test was in 2014 was discharged Allevyn monitor which showed sinus rhythm    Patient's stress test have been negative.  CT angiogram for aortic survey showed no dissection.  Patient had low normal EF with wall motion abnormalities on echocardiogram and abnormal nuclear stress test.  Cardiology consulted.  Patient will undergo angiogram tomorrow.  Continue aspirin 81 mg.  Discussed with Cardiology.  Continue Crestor 20 mg.  Continue telemetry.  Continue metoprolol.    Addendum.  Rapid response was called after patient developed hypertensive urgency and chest pain.  Patient was started on nitroglycerin infusion and EKG has been Reviewed by cardiology patient was started on heparin drip.  Patient did not tolerate bolus dosing with hydralazine for BP control.  S  Patient will have a coronary angiogram tomorrow.  Patient's troponin's repeat have been negative.  Continue telemetry monitoring.  Patient being transferred to the cardiac floor.  Trend troponins.    #Anxiety   Discussed about nonpharmacological methods including CBT, meditation and deep breathing  Continue home medication Lexapro 10 mg.      #Left upper extremity and circumoral tingling  MRI negative for stroke                 Diet: NPO for Medical/Clinical Reasons Except for: Meds  Low Saturated Fat Na <2400 mg    DVT Prophylaxis: Heparin SQ  Jason Catheter: Not present  Lines: None     Cardiac Monitoring: ACTIVE order. Indication: Chest pain/ ACS rule out (24  "hours)  Code Status: Full Code      Clinically Significant Risk Factors Present on Admission                  # Hypertension: home medication list includes antihypertensive(s)      # Obesity: Estimated body mass index is 30.29 kg/m  as calculated from the following:    Height as of this encounter: 1.765 m (5' 9.5\").    Weight as of this encounter: 94.4 kg (208 lb 1.8 oz).           Disposition Plan      Expected Discharge Date: 02/10/2023        Discharge Comments: swapna Rice MD  Hospitalist Service  [facility]  Securely message with [employer] (more info)  Text page via [employer] Paging/Directory   ______________________________________________________________________    Interval History   Patient seen and examined at bedside.  Wife present at bedside.  Patient slightly anxious.  I did discuss about the abnormal stress test.  Having some mild pain-right side of the chest radiating to the neck.  Does not have any lightheadedness or shortness of breath    Physical Exam   Vital Signs: Temp: 99.4  F (37.4  C) Temp src: Oral BP: (!) 145/76 Pulse: 86   Resp: 18 SpO2: 95 % O2 Device: None (Room air)    Weight: 208 lbs 1.83 oz        Medical Decision Making       "

## 2023-02-10 NOTE — PROGRESS NOTES
Orientation/Cognitive: A/Ox4, very anxoius  Observation Goals (Met/ Not Met): Not Met  Mobility Level/Assist Equipment: Independent throughout the day, now SBA.   Fall Risk (Y/N): No  Behavior Concerns: None  Pain Management: Sudden chest after IV Hydralazine administration. RRT called, refer to RRT note.   Tele/VS/O2: Elevated BP,other VSS on RA.Tele- SR w/ BBB.  ABNL Lab/BG: Abnormal Lexiscan today. ECHO today EF 45-50%.   Diet: Cardiac diet. Good appetite. NPO at midnight.  Bowel/Bladder: Continent  Skin Concerns: none  Drains/Devices: PIV infusing NS at 125/hr and Heparin gtt @ 1150units/hr.   Tests/Procedures for next shift: Nitroglycerin gtt  Anticipated DC date & active delays: TBD.   Plan for Coronary Angio tomorrow 2/10. Pt needs to transfer to CCU awaiting [employer].

## 2023-02-10 NOTE — PLAN OF CARE
Care plan note:      Recent Vitals:  Temp: 99.4  F (37.4  C) Temp src: Oral BP: 130/79  Pulse: 68   Resp: 20 SpO2: 95 % O2 Device: None (Room air)      Orientation/Neuro: Alert and Oriented x 4  Pain: Pain is controlled with current analgesics.  Medication(s) being used: acetaminophen   Tele: Sinus rhythm  and Sinus tachycardia   IV medications: Nitroglycerin   Mobility: St. by assist   Skin: With in normal limits   GI: WDL and no complaints  : WDL     Diet: Tolerating diet:   Well  Orders Placed This Encounter      NPO for Medical/Clinical Reasons Except for: Meds      NPO for Medical/Clinical Reasons Except for: Meds      Safety/Concerns:  Fall Risk  Aggression Color: Green    Plan: Patient rested between cares overnight- patient was anxious for procedure and didn't sleep much, wife stayed at bedside overnight. VSS on RA. Patient had minimal chest pain overnight- patient stated when they do have chest pain, it comes and goes quickly and patient feels like they have GERD symptoms with it- chest pain was well controlled with Nitroglycerin gtt at 20 mcg/min. Heparin gtt running at 1300 unit(s)/hr, next HepXa at 0730 AM.  Patient did not report any SOB. Patient has been NPO since 0000 for angio today.    Continue to monitor.      Kaylee Gee RN

## 2023-02-10 NOTE — PROGRESS NOTES
Arrived in CCU from short stay at 1900. Denies pain at this time. Says it comes and goes quickly. BP elevated. Orders to start nitroglycerin gtt. Family at bedside.

## 2023-02-10 NOTE — PROGRESS NOTES
[facility]    Medicine Progress Note - Hospitalist Service    Date of Admission:  2/8/2023    Assessment & Plan   Patient is a 58-year-old male with a past medical history of hypertension, hyperlipidemia, anxiety, coming to the ER with chest pain.  #Chest pain  Abnormal stress test  #Unstable angina.  S/P LAD stent placement   Patient is EKG showed a left bundle branch block with unknown chronicity.  Previous stress echo from 2014 did show a left bundle branch block with exercise.  #Last stress test was in 2014 was discharged Allevyn monitor which showed sinus rhythm    Patient's stress test have been negative.  CT angiogram for aortic survey showed no dissection.  Patient had low normal EF with wall motion abnormalities on echocardiogram and abnormal nuclear stress test.  Cardiology consulted.  Rapid response was called on 2/9/23 for hypertensive urgency and chest pain and patient was transferred to the cardiac care unit and was started on heparin drip, nitroglycerin drip and his troponins were trended.    Patient underwent angiogram today s/p LAD stent  Continue aspirin 81 mg.  Continue Crestor 20 mg.  Continue telemetry.  Continue metoprolol.  -Continue Brillanta  Will need cardiac rehab at discharge    #Anxiety   Discussed about nonpharmacological methods including CBT, meditation and deep breathing  Continue home medication Lexapro 10 mg.      #Left upper extremity and circumoral tingling  MRI negative for stroke      #Mild cardiomyopathy ISchemic cardiomyopathy.    -EF at 45%.  -Underwent LAD stenting today, currently having the                 Diet: Low Saturated Fat Na <2400 mg    DVT Prophylaxis: Heparin SQ  Jason Catheter: Not present  Lines: PRESENT             Cardiac Monitoring: ACTIVE order. Indication: Post- PCI/Angiogram (24 hours)  Code Status: Full Code      Clinically Significant Risk Factors Present on Admission                  # Hypertension: home medication list  "includes antihypertensive(s)      # Obesity: Estimated body mass index is 30.54 kg/m  as calculated from the following:    Height as of this encounter: 1.765 m (5' 9.5\").    Weight as of this encounter: 95.2 kg (209 lb 12.8 oz).           Disposition Plan      Expected Discharge Date: 02/11/2023        Discharge Comments: swapna Rice MD  Hospitalist Service    Securely message with [email or web address] (more info)  Text page via [employer] Paging/Directory   ______________________________________________________________________    Interval History   Patient seen and examined at bedside.  No chest pain  No abdominal pain  No nausea or vomiting         Physical Exam   Vital Signs: Temp: 98.1  F (36.7  C) Temp src: Oral BP: 120/74 Pulse: 69   Resp: 10 SpO2: (!) 88 % O2 Device: None (Room air)    Weight: 209 lbs 12.8 oz        Medical Decision Making       "

## 2023-02-10 NOTE — PROGRESS NOTES
Observation goals  PRIOR TO DISCHARGE       Comments: List all goals to be met before discharge home:      - Serial troponins and stress test complete: Partially met     - Seen and cleared by consultant if applicable :Not met     - Adequate pain control on oral analgesia : Met     - Vital signs normal or at patient baseline: Partially met, elevated BP      - Safe disposition plan has been identified: Met     - Nurse to notify provider when observation goals have been met and patient is ready for discharge.

## 2023-02-11 ENCOUNTER — APPOINTMENT (OUTPATIENT)
Dept: OCCUPATIONAL THERAPY | Facility: CLINIC | Age: 58
End: 2023-02-11
Attending: STUDENT IN AN ORGANIZED HEALTH CARE EDUCATION/TRAINING PROGRAM
Payer: COMMERCIAL

## 2023-02-11 LAB
T4 FREE SERPL-MCNC: 1.21 NG/DL (ref 0.9–1.7)
TSH SERPL DL<=0.005 MIU/L-ACNC: 7.77 UIU/ML (ref 0.3–4.2)

## 2023-02-11 PROCEDURE — 99232 SBSQ HOSP IP/OBS MODERATE 35: CPT | Performed by: STUDENT IN AN ORGANIZED HEALTH CARE EDUCATION/TRAINING PROGRAM

## 2023-02-11 PROCEDURE — 96375 TX/PRO/DX INJ NEW DRUG ADDON: CPT

## 2023-02-11 PROCEDURE — 96376 TX/PRO/DX INJ SAME DRUG ADON: CPT

## 2023-02-11 PROCEDURE — 97165 OT EVAL LOW COMPLEX 30 MIN: CPT | Mod: GO | Performed by: OCCUPATIONAL THERAPIST

## 2023-02-11 PROCEDURE — G0378 HOSPITAL OBSERVATION PER HR: HCPCS

## 2023-02-11 PROCEDURE — 250N000013 HC RX MED GY IP 250 OP 250 PS 637: Performed by: STUDENT IN AN ORGANIZED HEALTH CARE EDUCATION/TRAINING PROGRAM

## 2023-02-11 PROCEDURE — 250N000013 HC RX MED GY IP 250 OP 250 PS 637: Performed by: PHYSICIAN ASSISTANT

## 2023-02-11 PROCEDURE — 97535 SELF CARE MNGMENT TRAINING: CPT | Mod: GO | Performed by: OCCUPATIONAL THERAPIST

## 2023-02-11 PROCEDURE — 250N000013 HC RX MED GY IP 250 OP 250 PS 637: Performed by: INTERNAL MEDICINE

## 2023-02-11 PROCEDURE — 93010 ELECTROCARDIOGRAM REPORT: CPT | Performed by: INTERNAL MEDICINE

## 2023-02-11 PROCEDURE — 250N000011 HC RX IP 250 OP 636

## 2023-02-11 PROCEDURE — 250N000009 HC RX 250

## 2023-02-11 PROCEDURE — 93005 ELECTROCARDIOGRAM TRACING: CPT

## 2023-02-11 PROCEDURE — 97110 THERAPEUTIC EXERCISES: CPT | Mod: GO | Performed by: OCCUPATIONAL THERAPIST

## 2023-02-11 RX ORDER — NALOXONE HYDROCHLORIDE 0.4 MG/ML
0.4 INJECTION, SOLUTION INTRAMUSCULAR; INTRAVENOUS; SUBCUTANEOUS
Status: DISCONTINUED | OUTPATIENT
Start: 2023-02-11 | End: 2023-02-12 | Stop reason: HOSPADM

## 2023-02-11 RX ORDER — MAGNESIUM SULFATE HEPTAHYDRATE 40 MG/ML
2 INJECTION, SOLUTION INTRAVENOUS ONCE
Status: COMPLETED | OUTPATIENT
Start: 2023-02-11 | End: 2023-02-11

## 2023-02-11 RX ORDER — PANTOPRAZOLE SODIUM 40 MG/1
40 TABLET, DELAYED RELEASE ORAL
Status: DISCONTINUED | OUTPATIENT
Start: 2023-02-12 | End: 2023-02-12 | Stop reason: HOSPADM

## 2023-02-11 RX ORDER — NALOXONE HYDROCHLORIDE 0.4 MG/ML
0.2 INJECTION, SOLUTION INTRAMUSCULAR; INTRAVENOUS; SUBCUTANEOUS
Status: DISCONTINUED | OUTPATIENT
Start: 2023-02-11 | End: 2023-02-12 | Stop reason: HOSPADM

## 2023-02-11 RX ORDER — METOPROLOL TARTRATE 1 MG/ML
5 INJECTION, SOLUTION INTRAVENOUS ONCE
Status: COMPLETED | OUTPATIENT
Start: 2023-02-11 | End: 2023-02-11

## 2023-02-11 RX ADMIN — ROSUVASTATIN CALCIUM 40 MG: 10 TABLET, FILM COATED ORAL at 21:07

## 2023-02-11 RX ADMIN — ESCITALOPRAM OXALATE 10 MG: 10 TABLET, FILM COATED ORAL at 21:07

## 2023-02-11 RX ADMIN — TICAGRELOR 90 MG: 90 TABLET ORAL at 21:08

## 2023-02-11 RX ADMIN — METOPROLOL SUCCINATE 100 MG: 100 TABLET, EXTENDED RELEASE ORAL at 08:28

## 2023-02-11 RX ADMIN — METOPROLOL TARTRATE 5 MG: 5 INJECTION INTRAVENOUS at 05:46

## 2023-02-11 RX ADMIN — APIXABAN 5 MG: 5 TABLET, FILM COATED ORAL at 21:08

## 2023-02-11 RX ADMIN — ASPIRIN 81 MG: 81 TABLET, COATED ORAL at 08:28

## 2023-02-11 RX ADMIN — AMLODIPINE BESYLATE 2.5 MG: 2.5 TABLET ORAL at 08:28

## 2023-02-11 RX ADMIN — LISINOPRIL 40 MG: 40 TABLET ORAL at 08:28

## 2023-02-11 RX ADMIN — ACETAMINOPHEN 650 MG: 325 TABLET, FILM COATED ORAL at 21:44

## 2023-02-11 RX ADMIN — TICAGRELOR 90 MG: 90 TABLET ORAL at 12:36

## 2023-02-11 RX ADMIN — MAGNESIUM SULFATE HEPTAHYDRATE 2 G: 40 INJECTION, SOLUTION INTRAVENOUS at 05:49

## 2023-02-11 ASSESSMENT — ACTIVITIES OF DAILY LIVING (ADL)
ADLS_ACUITY_SCORE: 31
PREVIOUS_RESPONSIBILITIES: MEAL PREP;HOUSEKEEPING;LAUNDRY;SHOPPING;YARDWORK;MEDICATION MANAGEMENT;FINANCES;DRIVING;WORK
ADLS_ACUITY_SCORE: 31

## 2023-02-11 NOTE — PROGRESS NOTES
[facility] Cardiology Progress Note      Subjective   58M who presented with unstable angina, underwent successful PCI to LAD on 2/10.     Unfortunatley went into AF with RVR overnight x2, received IV metoprolol and converted back to sinus. Now maintaining sinus and feels well, no major complaints no new chest pain/SOB    Objective     VITAL SIGNS  Temp:  [98.1  F (36.7  C)-98.9  F (37.2  C)] 98.1  F (36.7  C)  Pulse:  [] 70  Resp:  [9-19] 17  BP: ()/() 118/74  SpO2:  [88 %-98 %] 97 %  Admission Weight: 86.2 kg (190 lb)  Current Weight: 92.5 kg (204 lb)    PHYSICAL EXAMINATION  General:  Well-developed, well-nourished. No acute distress. Alert and oriented x 3.  Pleasant and cooperative.  HEENT:  Extraocular movements grossly intact. Sclera Anicteric.  Cardiovascular:  Regular rate and rhythm. Normal S1 & S2. No murmurs, gallops, or rubs  Lungs:  Normal work of breathing. .  Abdomen:  Soft, nontender, nondistended.   Extremities:  Warm, well perfused. Right femoral access site without hematoma, distal pulses intact, No significant edema.   Neuro: Moving all extremities, no gross deficits noted    DIAGNOSTICS    Most Recent 3 CBC's:  Recent Labs   Lab Test 02/09/23  1612 02/08/23  1316   WBC 10.3 8.3   HGB 15.0 14.7   MCV 85 86    177     Most Recent 3 BMP's:  Recent Labs   Lab Test 02/10/23  2300 02/09/23  1603 02/08/23  1316     --  137   POTASSIUM 3.5  --  3.5   CHLORIDE 100  --  98   CO2 26  --  27   BUN 14.2  --  9.9   CR 0.82  --  0.88   ANIONGAP 13  --  12   DAVID 10.0  --  9.3   GLC 86  84 117* 150*     Most Recent 3 Troponin's:No lab results found.  Most Recent 3 BNP's:No lab results found.  Most Recent Cholesterol Panel:No lab results found.      Assessment & Plan     Mr. Harris is a pleasant 58 year old male who presented with unstable angina and underwent PCI to the LAD on 2/10. Went into new onset AF with RVR the evening of  2/10-2/11, converted back to sinus with IV metoprolol. Now feeling well and without complaints, maintaining sinus rhythm    We discussed his post PCI cares and new diagnosis of AF. Will commence Apixaban for anticoagulation, CHADs-VASc: 3. We can continue triple therapy for 1 week and then drop the Asa, continuing on Brilitna and Apixaban. We will watch him at least overnight given new AF and RVR    #1 Unstable Angina  # CAD s/p LAD PCI 2/10/2023  # New onset AF with RVR, CHADs-VASc: 3; converted to sinus  # Hypertension  # Hyperlipidemia  # Cardiomyopathy, LVEF 45%, ?ischemic    Plan:  - Commence Apixaban 5 BID for AC, triple therapy for 1 week and then can likely drop Asa  - PPI while on triple therapy  - Continue Asa/Brilinta for now  - Continue Metoprolol 100, if recurrent AF, can consider increasing to 150  - Continue Lisinopril, Crestor, Amlodipine  - Will need outpatient sleep apnea testing      Medical Decision Making       40 MINUTES SPENT BY ME on the date of service doing chart review, history, exam, documentation & further activities per the note.       Gordon Bradshaw MD  2/11/2023

## 2023-02-11 NOTE — PLAN OF CARE
"A&Ox4. Went into afib RVR (160s) and converted back to sinus ~2230. RRT called and metoprolol and ativan were given (BP 170s-190s). Symptomatic with \"heart flutters\". Patient went back into afib RVR (140s-150s) and BP 130s ~5:25am. Patient reported, \"I am not feeling the flutters in my chest this time and I also do not feel as anxious as the afib,\" ~06:15 and converted back to SR  (HR 70-80s, 130s SBP). SOB experienced during reported anxiety. No CP, other than  \"flutters\" during times of irregular rhythms. Groin site clean, dry, and intact. CMS intact. Ambulated to bathroom. Wife at bedside overnight.   Plan - evaluate events from last night, cardiac rehab? Previous plan to discharge today?  "

## 2023-02-11 NOTE — CODE/RAPID RESPONSE
[facility]  House SHANTELL RRT Note  2/10/2023   Time called: 2235  RRT called for: Afib RVR  Code status: Full Code    Assessment & Plan    Patient is a 58-year old male admitted on 2/8/2023 for chest pain.  Patient has a past medical history significant for hypertension, hyperlipidemia, and anxiety.  Patient underwent PCI stent to LAD earlier today on 2/10/2023.  Patient was started on Brilinta and ASA.  Patient is on BB and ACE as well.    I was paged to the bedside to evaluate Mr. Maximus Harris Jr. for an acute episode of atrial fibrillation with rapid ventricular response in the 150s. Patient was feeling palpitations at the time and stated it felt like he had something in his throat. Patient had also been experiencing right scapula and back pain during this event but stated that it was relieved after a large burp.    Upon arrival the patient was supine in bed.  Patient was alert and oriented.  Patient appeared to anxious and bilateral lower extremities were tremulous.  RN stated that the tremors began with the rapid heart rate.  Patient's heart rate was now 100-110 sinus with PACs.  Patient denied chest pain, dizziness, nausea, tingling.  Patient was able to move all extremities and stated that he had not experienced these tremors before.  Throughout my interview and assessment patient stated that he was feeling much better.  Patient is on metoprolol extended release, I ordered 2.5 mg IV metoprolol as his HR had improved to 95 bpm. Patient was also quite anxious so I ordered 0.5mg ativan. Patient's tremors subsided shortly after I arrived, blood glucose was normal at 86. Patient's BP was slightly lower during his atrial fibrillation episode and he became acutely hypertensive 175/104 after converting but he was soon normotensive.    Diagnosis:  -- Atrial fibrillation with RVR   Did not capture on EKG, telemetry strips reviewed and it appeared he went into atrial fibrillation at  2227.    Plan:  -- 2.5mg IV metoprolol once  -- 0.5mg IV lorazepam once  -- EKG  -- Blood glucose  -- BMP, Magnesium    At the conclusion of this RRT patient was hemodynamically stable and will remain on current unit    0530 Addendum:  Patient had a recurrent episode of afib RVR at 0530 and was in the 150-160 bpm rate for a longer period of time. RN notified me and I ordered an EKG and 5mg IV metoprolol. Patient converted to ST at 160 bpm. BP remained stable, patient described palpitations but no other symptoms. I ordered 2g IV magnesium sulfate as well.      His history is significant for:  Past Medical History:   Diagnosis Date     HTN (hypertension)      Hyperlipidemia LDL goal < 130      Nephrolithiasis      Past Surgical History:   Procedure Laterality Date     HERNIA REPAIR, INGUINAL RT/LT         Review of Systems   The 10 point Review of Systems is negative other than noted in the HPI or here.     Allergies   Allergies   Allergen Reactions     Penicillin G Rash       Physical Exam   Physical Exam  HENT:      Nose: Nose normal.      Mouth/Throat:      Mouth: Mucous membranes are moist.   Eyes:      Pupils: Pupils are equal, round, and reactive to light.   Cardiovascular:      Rate and Rhythm: Regular rhythm. Tachycardia present.      Pulses: Normal pulses.   Pulmonary:      Effort: Pulmonary effort is normal.   Abdominal:      Palpations: Abdomen is soft.      Tenderness: There is no abdominal tenderness.   Musculoskeletal:         General: Normal range of motion.      Cervical back: Normal range of motion.   Skin:     General: Skin is warm and dry.      Capillary Refill: Capillary refill takes less than 2 seconds.   Neurological:      General: No focal deficit present.      Mental Status: He is alert and oriented to person, place, and time.   Psychiatric:         Mood and Affect: Mood normal.         Vital Signs with Ranges:  Temp:  [98.1  F (36.7  C)-98.9  F (37.2  C)] 98.1  F (36.7  C)  Pulse:  [65-98]  80  Resp:  [6-28] 11  BP: (112-195)/() 118/76  SpO2:  [88 %-98 %] 98 %  I/O last 3 completed shifts:  In: 450 [P.O.:450]  Out: -     Data   Results for orders placed or performed during the hospital encounter of 02/08/23 (from the past 24 hour(s))   Heparin Unfractionated Anti Xa Level   Result Value Ref Range    Anti Xa Unfractionated Heparin 0.37 For Reference Range, See Comment IU/mL    Narrative    Therapeutic Range: UFH: 0.25-0.50 IU/mL for low intensity dosing,  0.30-0.70 IU/mL for high intensity dosing DVT and PE.  This test is not validated for other direct factor X inhibitors (e.g. rivaroxaban, apixaban, edoxaban, betrixaban, fondaparinux) and should not be used for monitoring of other medications.   Activated clotting time celite, POCT   Result Value Ref Range    Activated Clotting Time (Celite) POCT 135 74 - 150 seconds   Activated clotting time celite, POCT   Result Value Ref Range    Activated Clotting Time (Celite) POCT 229 (H) 74 - 150 seconds   Cardiac Catheterization    Narrative    1.  Single-vessel occlusive coronary artery disease with ulcerated   proximal LAD lesion  2.  Mild nonocclusive disease elsewhere  3.  Successful PCI of proximal to mid LAD with placement of a 3.5 x 15 mm   resolute Ed stent   EKG 12-lead, tracing only   Result Value Ref Range    Systolic Blood Pressure  mmHg    Diastolic Blood Pressure  mmHg    Ventricular Rate 74 BPM    Atrial Rate 74 BPM    NH Interval 174 ms    QRS Duration 142 ms     ms    QTc 506 ms    P Axis 56 degrees    R AXIS -42 degrees    T Axis 108 degrees    Interpretation ECG       Sinus rhythm  Biatrial enlargement  Left axis deviation  Left bundle branch block  Abnormal ECG  When compared with ECG of 09-FEB-2023 16:08, (unconfirmed)  QRS axis Shifted left     Basic metabolic panel   Result Value Ref Range    Sodium 139 136 - 145 mmol/L    Potassium 3.5 3.4 - 5.3 mmol/L    Chloride 100 98 - 107 mmol/L    Carbon Dioxide (CO2) 26 22 - 29  mmol/L    Anion Gap 13 7 - 15 mmol/L    Urea Nitrogen 14.2 6.0 - 20.0 mg/dL    Creatinine 0.82 0.67 - 1.17 mg/dL    Calcium 10.0 8.6 - 10.0 mg/dL    Glucose 84 70 - 99 mg/dL    GFR Estimate >90 >60 mL/min/1.73m2   Magnesium   Result Value Ref Range    Magnesium 1.9 1.7 - 2.3 mg/dL   Glucose by meter   Result Value Ref Range    GLUCOSE BY METER POCT 86 70 - 99 mg/dL     COVID-19 PCR Results    COVID-19 PCR Results   No data to display.         COVID-19 Antibody Results, Testing for Immunity    COVID-19 Antibody Results, Testing for Immunity   No data to display.           EKG:  Interpreted by ALTA Donato CNP  Time reviewed: 2310  Symptoms at time of EKG: palpitations   Rhythm: normal sinus   Rate: Normal  Axis: Normal  Ectopy: premature atrial contraction  Conduction: normal  ST Segments/ T Waves: No acute ischemic changes  Q Waves: none  Comparison to prior: Unchanged  Clinical Impression: normal EKG    Time Spent on this Encounter   I spent 35 minutes of critical care time on the unit/floor managing the care of Maximus Harris Jr.. Upon evaluation, this patient had a high probability of imminent or life-threatening deterioration due to atrial fibrillation with rapid ventricular response, which required my direct attention, intervention, and personal management. 100% of my time was spent at the bedside counseling the patient and/or coordinating care regarding services listed in this note.    ALTA Donato CNP  Hospitalist - House MARYLOU  Text me on the [employer] [email or web address] for a textback  Text page with web based paging for a callback

## 2023-02-11 NOTE — PROGRESS NOTES
"RRT called for patient converting to symptomatic Afib RVR (140s-150s). No hx of afib per patient. Reported feeling, \"chest fluttering and uncontrollable muscle twitching.\" Patient had tremors, leg twitching, warm to touch, BP 170s-190s, and afebrile. Reported, \"feeling less anxious and more control of body,\" post RRT w/ bedside NP. Heart rate currently in SR @ 70s. Meds given per MAR. Continue to monitor.   "

## 2023-02-11 NOTE — PROGRESS NOTES
"[facility]    Medicine Progress Note - Hospitalist Service    Date of Admission:  2/8/2023    Assessment & Plan   Patient is a 58-year-old male with a past medical history of hypertension, hyperlipidemia, anxiety, coming to the ER with chest pain.      #Chest pain  #.  Coronary artery disease   # Unstable angin  S/p LAD stent PCI on February  10 2023  -Continue aspirin, Brilinta and also patient started on apixaban for new onset A-fib  -Patient will be on triple therapy for 1 week after which aspirin can be dropped  -Continue Protonix while on triple therapy  -Continue crestor  -Continue metoprolol 100 mg daily  -        #Anxiety   Discussed about nonpharmacological methods including CBT, meditation and deep breathing  Continue home medication Lexapro 10 mg.      #Left upper extremity and circumoral tingling-resolved  MRI negative for stroke    #Atrial fibrillation with RVR  -Bharath Vasc score of 3  -Overnight had 2 episodes of A-fib with RVR  -Cardiology discussed with patient about anticoagulation with apixaban which  has been started  -Continue apixaban 5 mg twice daily  -Continue metoprolol 100 mg.If recurrent can increase to 150mg  -Patient will be observed overnight in the cardiology unit and can be discharged most likely tomorrow                 Diet: Low Saturated Fat Diet    DVT Prophylaxis DOAC  Jason Catheter: Not present  Lines: None     Cardiac Monitoring: ACTIVE order. Indication: Post- PCI/Angiogram (24 hours)  Code Status: Full Code      Clinically Significant Risk Factors Present on Admission                  # Hypertension: home medication list includes antihypertensive(s)     # DMII: A1C = 7.2 % (Ref range: <5.7 %) within past 3 months    # Overweight: Estimated body mass index is 29.69 kg/m  as calculated from the following:    Height as of this encounter: 1.765 m (5' 9.5\").    Weight as of this encounter: 92.5 kg (204 lb).           Disposition Plan      Expected " Discharge Date: 02/12/2023,  9:00 AM      Discharge Comments: swapna Rice MD  Hospitalist Service  [facility]  Securely message with [employer] (more info)  Text page via "[employer] Paging/Directory   ______________________________________________________________________    Interval History   Patient seen and examined at bedside.  Wife present at bedside.  No chest pain  -No palpitations  Denies shortness of breath  Overnight patient had 2 episodes of A-fib with RVR.  The first episode he was symptomatic.  The second episode the patient did not have any symptoms of palpitations.    Physical Exam   Vital Signs: Temp: 98.1  F (36.7  C) Temp src: Oral BP: (!) 153/86 Pulse: 89   Resp: 17 SpO2: 96 % O2 Device: None (Room air)    Weight: 204 lbs 0 oz        Medical Decision Making

## 2023-02-11 NOTE — PROGRESS NOTES
Occupational Therapy Discharge Summary    Reason for therapy discharge:    Discharged to home with outpatient therapy.    Progress towards therapy goal(s). See goals on Care Plan in [facility] electronic health record for goal details.  Goals partially met.  Barriers to achieving goals:   discharge from facility.    Therapy recommendation(s):    Continued therapy is recommended.  Rationale/Recommendations:  recommend home when medically stable with family A with strenuous IADL's ie snow removal, vacuuming, etc and OP CR near [place] for montiored progressive exercise and riskf actor ed and modification. .

## 2023-02-11 NOTE — PLAN OF CARE
Alert and oriented x 4. VS stable, on RA and no complaints of pain. Tele SR with BBB and rates in the 70's. He did wear 1L oxygen while sleeping and was able to maintain sats >90%. Right groin site is stable and intact with good CMS. He was up indep in the room and moved well. Plan for cardiac rehab and possible discharge tomorrow.

## 2023-02-12 ENCOUNTER — APPOINTMENT (OUTPATIENT)
Dept: CARDIOLOGY | Facility: CLINIC | Age: 58
End: 2023-02-12
Attending: INTERNAL MEDICINE
Payer: COMMERCIAL

## 2023-02-12 VITALS
BODY MASS INDEX: 29.18 KG/M2 | HEART RATE: 67 BPM | TEMPERATURE: 97.5 F | WEIGHT: 203.8 LBS | SYSTOLIC BLOOD PRESSURE: 122 MMHG | RESPIRATION RATE: 19 BRPM | OXYGEN SATURATION: 98 % | HEIGHT: 70 IN | DIASTOLIC BLOOD PRESSURE: 81 MMHG

## 2023-02-12 LAB
CHOLEST SERPL-MCNC: 162 MG/DL
ERYTHROCYTE [DISTWIDTH] IN BLOOD BY AUTOMATED COUNT: 12.3 % (ref 10–15)
HCT VFR BLD AUTO: 43.8 % (ref 40–53)
HDLC SERPL-MCNC: 39 MG/DL
HGB BLD-MCNC: 14.8 G/DL (ref 13.3–17.7)
LDLC SERPL CALC-MCNC: 93 MG/DL
MAGNESIUM SERPL-MCNC: 1.9 MG/DL (ref 1.7–2.3)
MCH RBC QN AUTO: 29.4 PG (ref 26.5–33)
MCHC RBC AUTO-ENTMCNC: 33.8 G/DL (ref 31.5–36.5)
MCV RBC AUTO: 87 FL (ref 78–100)
NONHDLC SERPL-MCNC: 123 MG/DL
PLATELET # BLD AUTO: 179 10E3/UL (ref 150–450)
POTASSIUM SERPL-SCNC: 3.4 MMOL/L (ref 3.4–5.3)
RBC # BLD AUTO: 5.03 10E6/UL (ref 4.4–5.9)
TRIGL SERPL-MCNC: 151 MG/DL
WBC # BLD AUTO: 7.8 10E3/UL (ref 4–11)

## 2023-02-12 PROCEDURE — 93227 XTRNL ECG REC<48 HR R&I: CPT | Performed by: INTERNAL MEDICINE

## 2023-02-12 PROCEDURE — 250N000013 HC RX MED GY IP 250 OP 250 PS 637: Performed by: PHYSICIAN ASSISTANT

## 2023-02-12 PROCEDURE — G0378 HOSPITAL OBSERVATION PER HR: HCPCS

## 2023-02-12 PROCEDURE — 93005 ELECTROCARDIOGRAM TRACING: CPT

## 2023-02-12 PROCEDURE — 93010 ELECTROCARDIOGRAM REPORT: CPT | Performed by: INTERNAL MEDICINE

## 2023-02-12 PROCEDURE — 93226 XTRNL ECG REC<48 HR SCAN A/R: CPT

## 2023-02-12 PROCEDURE — 85027 COMPLETE CBC AUTOMATED: CPT

## 2023-02-12 PROCEDURE — 83735 ASSAY OF MAGNESIUM: CPT | Performed by: STUDENT IN AN ORGANIZED HEALTH CARE EDUCATION/TRAINING PROGRAM

## 2023-02-12 PROCEDURE — 250N000013 HC RX MED GY IP 250 OP 250 PS 637: Performed by: STUDENT IN AN ORGANIZED HEALTH CARE EDUCATION/TRAINING PROGRAM

## 2023-02-12 PROCEDURE — 99239 HOSP IP/OBS DSCHRG MGMT >30: CPT | Performed by: STUDENT IN AN ORGANIZED HEALTH CARE EDUCATION/TRAINING PROGRAM

## 2023-02-12 PROCEDURE — 250N000013 HC RX MED GY IP 250 OP 250 PS 637: Performed by: INTERNAL MEDICINE

## 2023-02-12 PROCEDURE — 84132 ASSAY OF SERUM POTASSIUM: CPT | Performed by: STUDENT IN AN ORGANIZED HEALTH CARE EDUCATION/TRAINING PROGRAM

## 2023-02-12 PROCEDURE — 36415 COLL VENOUS BLD VENIPUNCTURE: CPT

## 2023-02-12 PROCEDURE — 80061 LIPID PANEL: CPT | Performed by: STUDENT IN AN ORGANIZED HEALTH CARE EDUCATION/TRAINING PROGRAM

## 2023-02-12 RX ORDER — ROSUVASTATIN CALCIUM 40 MG/1
40 TABLET, COATED ORAL DAILY
Qty: 90 TABLET | Refills: 0 | Status: SHIPPED | OUTPATIENT
Start: 2023-02-12 | End: 2023-07-17

## 2023-02-12 RX ORDER — POTASSIUM CHLORIDE 1500 MG/1
40 TABLET, EXTENDED RELEASE ORAL ONCE
Status: COMPLETED | OUTPATIENT
Start: 2023-02-12 | End: 2023-02-12

## 2023-02-12 RX ORDER — PANTOPRAZOLE SODIUM 40 MG/1
40 TABLET, DELAYED RELEASE ORAL
Qty: 30 TABLET | Refills: 0 | Status: SHIPPED | OUTPATIENT
Start: 2023-02-13 | End: 2023-03-15

## 2023-02-12 RX ORDER — POTASSIUM CHLORIDE 1.5 G/1.58G
40 POWDER, FOR SOLUTION ORAL ONCE
Status: COMPLETED | OUTPATIENT
Start: 2023-02-12 | End: 2023-02-12

## 2023-02-12 RX ORDER — AMLODIPINE BESYLATE 2.5 MG/1
2.5 TABLET ORAL DAILY
Qty: 90 TABLET | Refills: 0 | Status: SHIPPED | OUTPATIENT
Start: 2023-02-13 | End: 2023-07-17

## 2023-02-12 RX ORDER — NITROGLYCERIN 0.4 MG/1
TABLET SUBLINGUAL
Qty: 20 TABLET | Refills: 0 | Status: SHIPPED | OUTPATIENT
Start: 2023-02-12 | End: 2024-06-11

## 2023-02-12 RX ADMIN — LISINOPRIL 40 MG: 40 TABLET ORAL at 08:19

## 2023-02-12 RX ADMIN — METOPROLOL SUCCINATE 100 MG: 100 TABLET, EXTENDED RELEASE ORAL at 08:19

## 2023-02-12 RX ADMIN — ASPIRIN 81 MG: 81 TABLET, COATED ORAL at 08:19

## 2023-02-12 RX ADMIN — AMLODIPINE BESYLATE 2.5 MG: 2.5 TABLET ORAL at 08:19

## 2023-02-12 RX ADMIN — APIXABAN 5 MG: 5 TABLET, FILM COATED ORAL at 08:19

## 2023-02-12 RX ADMIN — PANTOPRAZOLE SODIUM 40 MG: 40 TABLET, DELAYED RELEASE ORAL at 08:19

## 2023-02-12 RX ADMIN — POTASSIUM CHLORIDE 40 MEQ: 1500 TABLET, EXTENDED RELEASE ORAL at 12:12

## 2023-02-12 RX ADMIN — TICAGRELOR 90 MG: 90 TABLET ORAL at 12:12

## 2023-02-12 ASSESSMENT — ACTIVITIES OF DAILY LIVING (ADL)
ADLS_ACUITY_SCORE: 31

## 2023-02-12 NOTE — PLAN OF CARE
Alert and oriented x4. VS stable, on RA and no complaints of pain today. Tele SR with rates in the 70's today and had no occurences of a fib this shift. He was up on multiple walks today and tolerated that activity very well. Plan for possible discharge tomorrow.

## 2023-02-12 NOTE — PROGRESS NOTES
[facility] Cardiology Progress Note      Subjective   58M who presented with unstable angina, underwent successful PCI to LAD on 2/10. Course complicated by AF w/ RVR now back in sinus    Interval events  No further AF, feels well, no more chest pain, mild right shoulder pain treated conservatively    Objective     VITAL SIGNS  Temp:  [97.5  F (36.4  C)-99  F (37.2  C)] 97.5  F (36.4  C)  Pulse:  [59-89] 67  Resp:  [18-19] 19  BP: (119-163)/(70-97) 122/81  SpO2:  [94 %-98 %] 98 %  Admission Weight: 86.2 kg (190 lb)  Current Weight: 92.4 kg (203 lb 12.8 oz)    PHYSICAL EXAMINATION  General:  Well-developed, well-nourished. No acute distress. Alert and oriented x 3.  Pleasant and cooperative.  HEENT:  Extraocular movements grossly intact. Sclera Anicteric.  Cardiovascular:  Regular rate and rhythm. Normal S1 & S2. No murmurs, gallops, or rubs  Lungs:  Normal work of breathing. .  Abdomen:  Soft, nontender, nondistended.   Extremities:  Warm, well perfused. Right femoral access site without hematoma, distal pulses intact, No significant edema.   Neuro: Moving all extremities, no gross deficits noted    DIAGNOSTICS    Most Recent 3 CBC's:  Recent Labs   Lab Test 02/12/23  0540 02/09/23  1612 02/08/23  1316   WBC 7.8 10.3 8.3   HGB 14.8 15.0 14.7   MCV 87 85 86    179 177     Most Recent 3 BMP's:  Recent Labs   Lab Test 02/12/23  0540 02/10/23  2300 02/09/23  1603 02/08/23  1316   NA  --  139  --  137   POTASSIUM 3.4 3.5  --  3.5   CHLORIDE  --  100  --  98   CO2  --  26  --  27   BUN  --  14.2  --  9.9   CR  --  0.82  --  0.88   ANIONGAP  --  13  --  12   DAVID  --  10.0  --  9.3   GLC  --  86  84 117* 150*     Most Recent 3 Troponin's:No lab results found.  Most Recent 3 BNP's:No lab results found.  Most Recent Cholesterol Panel:No lab results found.    TTE:  Interpretation Summary     There is mild concentric left ventricular hypertrophy.  Mildly decreased left  ventricular systolic function  The visual ejection fraction is 45-50%.  Base-mid anteroseptal and inferoseptal hypokinesis.  The right ventricle is mildly dilated.  The right ventricular systolic function is normal.  Moderate (46-55mmHg) pulmonary hypertension is present.  No significant valve dysfunction.  The inferior vena cava was normal in size with preserved respiratory  variability.  There is no pericardial effusion.    Assessment & Plan     Mr. Harris is a pleasant 58 year old male who presented with unstable angina and underwent PCI to the LAD on 2/10. Went into new onset AF with RVR the evening of 2/10-2/11, converted back to sinus with IV metoprolol. Now feeling well and without complaints, maintaining sinus rhythm    # Unstable Angina  # CAD s/p LAD PCI 2/10/2023  # New onset AF with RVR, CHADs-VASc: 3; converted to sinus  # Hypertension  # Hyperlipidemia  # Cardiomyopathy, LVEF 45%, ?ischemic  # Pulmonary Hypertension  # Likely FELICITA    Plan:  - Continue Asa, Brilinta, Apixaban for a total of 1 week, discharge Aspirin after 2/17 and continue with Brilinta/Apixaban  - Continue Metoprolol 100, Lisinopril, Amlodipine, Crestor  - Maintain K>4, Mg>2  - Outpatient f/u in 1 week with 24 hour holter CV SHANTELL and 1 month with MD  - Will need outpatient FELICITA screening  - Ok to discharge today    Medical Decision Making       30 MINUTES SPENT BY ME on the date of service doing chart review, history, exam, documentation & further activities per the note.       Gordon Bradshaw MD  2/12/2023

## 2023-02-12 NOTE — PLAN OF CARE
A&Ox4. VSS, RA, no CP/SOB. Pain in R shoulder treated with NSAID and ice - alleviated pain. Tele SR 60-70s. Tolerated activity well and UO adequate. No anxiety reported.   Plan - for possible discharge, work with cardiac rehab, go home on Brillinta and eliquis. oupt sleep study

## 2023-02-12 NOTE — DISCHARGE SUMMARY
[facility]  Hospitalist Discharge Summary      Date of Admission:  2/8/2023  Date of Discharge:  2/12/2023  4:02 PM  Discharging Provider: Calin Rice MD  Discharge Service: Hospitalist Service    Discharge Diagnoses   Unstable Angina  Afib with RVR    Follow-ups Needed After Discharge   Follow-up Appointments     Follow-up and recommended labs and tests       Follow up with primary care provider, Abram Bethea, within 7 days   for hospital follow- up.  The following labs/tests are recommended:   Hemoglobin A1c.    Follow with Cardiology         {Additional follow-up instructions/to-do's for PCP   Repeat EKG for QTC, Hba1c recheck and address need for further need of PPI for GI prophylaxis            Unresulted Labs Ordered in the Past 30 Days of this Admission     No orders found from 1/9/2023 to 2/9/2023.          Discharge Disposition   Discharged to home  Condition at discharge: Stable    Hospital Course     Patient is a 58-year-old male who presents with unstable angina.  He has a history of significant cardiac family history, anxiety.  Patient was initially admitted as a chest pain rule out.  Patient stress test and echocardiogram was abnormal.  Cardiology was consulted and patient underwent PCI to the LAD on 2/10, overnight he went into A-fib with RVR converted back into sinus rhythm with IV metoprolol.  Patient was continued on metoprolol for A-fib and due to his increased Bharath Vasc score of 3 he was started on Eliquis by cardiology.    # Coronary artery disease s/p LAD PCI 2/10/2023  Patient will be discharged on triple therapy aspirin Brilinta and Eliquis..  I prescribed him Protonix for a minimum of 2 to 3 weeks.  As he is on triple therapy and he will come off triple therapy after 5 more days and he will stop the aspirin and continue Brilinta and Eliquis.  Further continuation of the Protonix should be assessed with a primary care provider.  -Continue  Brilinta Eliquis and aspirin  -Outpatient cardiology follow-up in a week's time with a 24-hour event monitor.  -Patient will be on high intensity statin of 40 mg      #Ischemic cardiomyopathy  -LVEF 45%  -Continue lisinopril metoprolol  -Follow-up with cardiology    #Atrial fibrillation with RVR  -Patient converted to normal sinus rhythm on IV metoprolol  -Continue metoprolol 100 mg daily  -Bharath Vasc of 3 continue Eliquis, bleeding risk with Eliquis discussed with patient and he will he will closely monitor for bleeding  -TSH slightly elevated and T4 within normal limits  -Outpatient referral for sleep clinic as patient has risk for sleep apnea        #Prolonged QTc  -Repeat EKG showed improvement of QTc.  -Avoid QTc prolonging meds and I stopped his home Allegra  -Outpatient follow-up with primary care with repeat EKG  -Repeat BMP and magnesium in a week      #Hyperlipidemia  Triglycerides 151 HDL 39 LDL 93  -Continue Crestor        #New onset diabetes?  -Hemoglobin A1c 7.2.  Patient currently wants conservative management and does not want to be started on metformin and will do lifestyle modification and follow-up with his primary care closely and recheck hemoglobin A1c                        Consultations This Hospital Stay   CARDIOLOGY IP CONSULT  PHARMACY IP CONSULT  PHARMACY IP CONSULT  PHARMACY IP CONSULT  PHARMACY IP CONSULT  HOSPITALIST IP CONSULT  NUTRITION SERVICES ADULT IP CONSULT  CARDIAC REHAB IP CONSULT  PHARMACY IP CONSULT  PHARMACY LIAISON FOR MEDICATION COVERAGE CONSULT  SMOKING CESSATION PROGRAM IP CONSULT    Code Status   Full Code    Time Spent on this Encounter   I, Calin Rice MD, personally saw the patient today and spent greater than 30 minutes discharging this patient.       Calin Rice MD  [facility] CORONARY CARE UNIT  [address]  [facility] [address]  Phone:  [phone]  ______________________________________________________________________    Physical Exam   Vital Signs: Temp: 97.5  F (36.4  C) Temp src: Oral BP: 122/81 Pulse: 67   Resp: 19 SpO2: 98 % O2 Device: None (Room air)    Weight: 203 lbs 12.8 oz  Physical Exam  Cardiovascular:      Rate and Rhythm: Normal rate and regular rhythm.   Pulmonary:      Effort: Pulmonary effort is normal. No respiratory distress.   Abdominal:      General: There is no distension.      Palpations: Abdomen is soft.      Tenderness: There is no abdominal tenderness.   Musculoskeletal:      Right lower leg: No edema.      Left lower leg: No edema.          Primary Care Physician   Abram Bethea    Discharge Orders      CARDIAC REHAB REFERRAL      Cardiac Rehab Referral      Follow-Up with Cardiology      Follow-Up with Cardiology SHANTELL      Adult Sleep Eval & Management Referral      Brief Discharge Instructions    Do NOT stop your aspirin or platelet inhibitor unless directed by your Cardiologist.  These medications help to prevent platelets in your blood from sticking together and forming a clot.  Examples of these medications are:  Ticagrelor (Brilinta), Clopidigrel (Plavix), Prasugrel (Effient)     When to call - Contact the Heart Clinic    You may experience symptoms that require follow-up before your scheduled appointment. Contact the Heart Clinic if you develop: Fever over 100.4o Fahrenheit, that lasts more than one day; Redness, heat, or pus at the puncture site; Change in color or temperature in your groin or leg.     When to call - Reasons to Call 911    If your groin starts to bleed or begins to swell suddenly after leaving the hospital, lie flat and apply firm pressure just above the puncture site for 15 minutes.  If bleeding continues, call 9-1-1.     Precautions - Lifting    NO lifting of more than 10 pounds for at least 3 days.  If you usually lift 50 pounds or more daily, talk with your Cardiologist.     Precautions  - Household Activities    Avoid any hard work or tiring activities.  NO physical activity such as mowing the lawn, raking, vacuuming, changing sheets on your bed, snow shoveling, or using a .     Precautions - Active Sports Activities    Avoid any tiring sports activities.  This includes, yard work, jogging, biking, bowling, swimming, tennis or golf, and sexual activity.     Precautions - Elective Dental Work    NO elective dental work for 6 weeks after receiving a stent.     Comfort and Pain Management - Pain after Surgery    Pain after surgery is normal and expected.  Your leg may be sore or stiff for a few days, and your pain will improve with time. You may take Tylenol or a pain medicine recommended by your Cardiologist.     Comfort and Pain Management - Bruising after Surgery    Bruising around the groin area is normal.  It may take 2-3 weeks for this to go away.  It is normal for the bruised area to turn green and/or yellow as it is healing.  A small lump may also be present and may last 2-3 months.     Activity - Daily Walking    During the day get up and walk around every 2 hours.     Activity - Light Household Activities    Light household activities are ok.     Activity - Elevate Legs    Elevate legs in between all activities.     Activity - Cardiac Rehab    You are encouraged to enroll in an Outpatient Cardiac Rehab program after discharge from the hospital.  Our Cardiac Rehab staff may visit briefly with you while you're in the hospital.  If they miss you, someone will contact you after you are home.     Return to Driving    Driving is NOT permitted for 24 hours after surgery     Return to work    You may return to work after 72 hours if you are feeling well and your job does not involve heavy lifting.     Dressing Removal    You may take off the dressing on your groin the day after your procedure.     Incision Care    Keep the incision area dry and clean.  You do not need to use a bandage on  your incision.     Shower / Bathing    It is ok to shower with regular soap. Pat dry, do not rub. No tub bath for 3 days. No swimming in a pool or hot tub immersion for 1 week     Reason aspirin not prescribed from this order set     Reason Lipid Lowering Medications not prescribed from this order set     Reason for your hospital stay    Chest pain and Atrial Fibrallation     Follow-up and recommended labs and tests     Follow up with primary care provider, Abram Bethea, within 7 days for hospital follow- up.  The following labs/tests are recommended: Hemoglobin A1c.    Follow with Cardiology     Activity    Your activity upon discharge: activity as tolerated     Holter Monitor 24 hour Adult Pediatric     Diet    Follow this diet upon discharge: Orders Placed This Encounter      Low Saturated Fat Diet and Carb controlled diet       Significant Results and Procedures   Results for orders placed or performed during the hospital encounter of 02/08/23   Head CT w/o contrast    Narrative    CT SCAN OF THE HEAD WITHOUT CONTRAST   2/8/2023 1:43 PM     HISTORY: Headache, left arm paresthesias.    TECHNIQUE: Axial images of the head and coronal reformations without  IV contrast material. Radiation dose for this scan was reduced using  automated exposure control, adjustment of the mA and/or kV according  to patient size, or iterative reconstruction technique.    COMPARISON: None.    FINDINGS:  No evidence of hemorrhage. Parenchyma within normal limits for age. No  acute osseous abnormality.      Impression    IMPRESSION:   No acute intracranial abnormality.    EMERSON GRAY MD         SYSTEM ID:  [id number]   CT Aortic Survey w Contrast    Narrative    CT AORTIC SURVEY WITH CONTRAST 2/8/2023 1:44 PM    CLINICAL HISTORY: Acute left chest tightness and left arm  paresthesias. Right upper back and shoulder pain. Headache.    TECHNIQUE: Aortic survey protocol CT chest, abdomen, and pelvis.  Arterial phase through the  chest, abdomen, and pelvis. Precontrast  images were also performed through the chest. Dose reduction  techniques were used.   CONTRAST: 80 mL [id number]-[id number]    COMPARISON: None.    FINDINGS:  VASCULATURE: No evidence for aortic aneurysm or dissection. The great  vessels, renal arteries, celiac artery, SMA, and RABIA are patent. There  is mild atherosclerotic calcification of the abdominal aorta. The  pulmonary arteries are moderately well opacified, and there is no  evidence for pulmonary embolism. Circumaortic left renal vein.    LUNGS AND PLEURA: No pleural effusions. Small calcified granuloma in  the right middle lobe. The lungs are otherwise clear. No lung masses  or consolidations. No pneumothorax.    MEDIASTINUM/AXILLAE: No enlarged lymph nodes in the chest. No  pericardial effusion.    CORONARY ARTERY CALCIFICATION: Mild.    HEPATOBILIARY: Hepatic steatosis. No hepatic masses. No calcified  gallstones.    PANCREAS: Normal.    SPLEEN: Normal.    ADRENAL GLANDS: Normal.    KIDNEYS/BLADDER: Mild diffuse bladder wall thickening could be related  to prostatic enlargement, although cystitis could also have this  appearance. The kidneys are unremarkable. No hydronephrosis.    BOWEL: No bowel obstruction. No convincing evidence for colitis or  diverticulitis. Unremarkable appendix.    PELVIC ORGANS: Mild prostatic enlargement.    LYMPH NODES: No enlarged lymph nodes are identified in the abdomen or  pelvis.    ADDITIONAL FINDINGS: None.    MUSCULOSKELETAL: Unremarkable.      Impression    IMPRESSION:  1.  No evidence for aortic aneurysm or dissection.  2.  Hepatic steatosis.  3.  Mild diffuse bladder wall thickening could be related to prostatic  enlargement, although cystitis could also have this appearance.    SALMA LYMAN MD         SYSTEM ID:  [id number]   MR Brain w/o Contrast    Narrative    EXAM: MR BRAIN W/O CONTRAST  LOCATION: [facility]  DATE/TIME: 2/8/2023 7:19 PM    INDICATION: Left  upper extremity and facial numbness, rule out acute CVA  COMPARISON: CT head 2023  TECHNIQUE: Routine multiplanar multisequence head MRI without intravenous contrast.    FINDINGS:  INTRACRANIAL CONTENTS: No acute or subacute infarct. No mass, acute hemorrhage, or extra-axial fluid collections. Normal brain parenchymal signal. Mild volume loss and presumed chronic small vessel ischemia.    SELLA: No abnormality accounting for technique.    OSSEOUS STRUCTURES/SOFT TISSUES: Normal marrow signal. The major intracranial vascular flow voids are maintained.     ORBITS: No abnormality accounting for technique.     SINUSES/MASTOIDS: No paranasal sinus mucosal disease. No middle ear or mastoid effusion.       Impression    IMPRESSION:  1.  No acute intracranial abnormality.    2.  Age-related change.   Echocardiogram Complete     Value    LVEF  45-50%    Biplane LVEF 48%    Narrative    [id number]  [id number]  [id number]^DANNIELLE^WILMAR     [facility]  Echocardiography Laboratory  [address] [place] [address]     Name: WOODROW HOWELL JR. ELEONORA  MRN: [id number]  : 1965  Study Date: 2023 12:10 PM  Age: 58 yrs  Gender: Male  Patient Location: [facility]  Reason For Study: Chest Pain  Ordering Physician: WILMAR SPICER  Referring Physician: WILMAR SPICER  Performed By: Inder Rondon     BSA: 2.0 m2  Height: 69 in  Weight: 190 lb  HR: 84  ______________________________________________________________________________  Procedure  Complete Echo Adult. Optison (NDC #[id number]) given intravenously.  ______________________________________________________________________________  Interpretation Summary     There is mild concentric left ventricular hypertrophy.  Mildly decreased left ventricular systolic function  The visual ejection fraction is 45-50%.  Base-mid anteroseptal and inferoseptal hypokinesis.  The right ventricle is mildly dilated.  The right ventricular systolic  function is normal.  Moderate (46-55mmHg) pulmonary hypertension is present.  No significant valve dysfunction.  The inferior vena cava was normal in size with preserved respiratory  variability.  There is no pericardial effusion.     Compared to resting stress echocardiogram images dated 10/29/2014, there is  abnormal septal motion and pulmonary hypertension as above.  ______________________________________________________________________________  Left Ventricle  The left ventricle is normal in size. There is mild concentric left  ventricular hypertrophy. The visual ejection fraction is 45-50%. Mildly  decreased left ventricular systolic function. Left ventricular diastolic  function is indeterminate. Biplane LVEF is 48%. Base-mid anteroseptal and  inferoseptal hypokinesis.     Right Ventricle  The right ventricle is mildly dilated. The right ventricular systolic function  is normal.     Atria  The left atrium is mildly dilated. Right atrial size is normal.     Mitral Valve  The mitral valve is normal in structure and function. There is mild (1+)  mitral regurgitation.     Tricuspid Valve  The tricuspid valve is normal in structure and function. The right ventricular  systolic pressure is approximated at 42mmHg plus the right atrial pressure.  Moderate (46-55mmHg) pulmonary hypertension is present. There is mild (1+)  tricuspid regurgitation.     Aortic Valve  There is trivial trileaflet aortic sclerosis.     Pulmonic Valve  The pulmonic valve is normal in structure and function.     Vessels  The aortic root is normal size. Normal size ascending aorta. The inferior vena  cava was normal in size with preserved respiratory variability.     Pericardium  There is no pericardial effusion.     ______________________________________________________________________________  MMode/2D Measurements & Calculations  IVSd: 1.1 cm  LVIDd: 5.4 cm  LVIDs: 3.7 cm  LVPWd: 0.95 cm  FS: 31.4 %     LV mass(C)d: 211.1 grams  LV  mass(C)dI: 104.4 grams/m2  Ao root diam: 3.0 cm  asc Aorta Diam: 3.1 cm  LVOT diam: 2.0 cm  LVOT area: 3.1 cm2  LA Volume (BP): 88.0 ml  LA Volume Index (BP): 43.6 ml/m2  RWT: 0.35     Doppler Measurements & Calculations  MV E max gabriel: 84.0 cm/sec  MV A max gabriel: 58.3 cm/sec  MV E/A: 1.4     MV dec slope: 662.9 cm/sec2  MV dec time: 0.13 sec  Ao V2 max: 157.5 cm/sec  Ao max PG: 10.0 mmHg  ELLIOT(V,D): 2.2 cm2  LV V1 max P.8 mmHg  LV V1 max: 109.7 cm/sec  LV V1 VTI: 20.0 cm  SV(LVOT): 61.8 ml  SI(LVOT): 30.6 ml/m2  PA V2 max: 176.5 cm/sec  PA max P.5 mmHg  PA acc time: 0.10 sec  TR max gabriel: 327.1 cm/sec  TR max P.8 mmHg  AV Gabriel Ratio (DI): 0.70  Lateral E/e': 8.0     ______________________________________________________________________________  Report approved by: Moy Andrade 2023 01:09 PM         Cardiac Catheterization    Narrative    1.  Single-vessel occlusive coronary artery disease with ulcerated   proximal LAD lesion  2.  Mild nonocclusive disease elsewhere  3.  Successful PCI of proximal to mid LAD with placement of a 3.5 x 15 mm   resolute [place] stent   NM Lexiscan stress test (nuc card)     Value    Target     Baseline Systolic     Baseline Diastolic     Last Stress Systolic     Last Stress Diastolic     Baseline HR 96    Max HR  110    Max Predicted HR  67    Rate Pressure Product 20,240.0    Left Ventricular EF 53    Narrative       The nuclear stress test is probably abnormal.     There is a small area of nontransmural infarction in the anterior and   apical segment(s) of the left ventricle.     Left ventricular function is low normal.     The left ventricular ejection fraction at stress is 53%.     There is no prior study for comparison.           Discharge Medications   Current Discharge Medication List      START taking these medications    Details   amLODIPine (NORVASC) 2.5 MG tablet Take 1 tablet (2.5 mg) by mouth daily for 90 days  Qty: 90  tablet, Refills: 0    Associated Diagnoses: Coronary artery disease involving native coronary artery of native heart with unstable angina pectoris (H)      apixaban ANTICOAGULANT (ELIQUIS) 5 MG tablet Take 1 tablet (5 mg) by mouth 2 times daily for 30 days  Qty: 60 tablet, Refills: 0    Associated Diagnoses: Coronary artery disease involving native coronary artery of native heart with unstable angina pectoris (H)      aspirin (ASA) 81 MG EC tablet Take aspirin 81mg till 2/17/23 and stop  Qty: 10 tablet, Refills: 0    Associated Diagnoses: Coronary artery disease involving native coronary artery of native heart with unstable angina pectoris (H)      pantoprazole (PROTONIX) 40 MG EC tablet Take 1 tablet (40 mg) by mouth every morning (before breakfast) for 30 days  Qty: 30 tablet, Refills: 0    Associated Diagnoses: Coronary artery disease involving native coronary artery of native heart with unstable angina pectoris (H)      ticagrelor (BRILINTA) 90 MG tablet Take 1 tablet (90 mg) by mouth every 12 hours for 60 days  Qty: 120 tablet, Refills: 1    Associated Diagnoses: Coronary artery disease involving native coronary artery of native heart with unstable angina pectoris (H)         CONTINUE these medications which have CHANGED    Details   rosuvastatin (CRESTOR) 40 MG tablet Take 1 tablet (40 mg) by mouth daily  Qty: 90 tablet, Refills: 0    Associated Diagnoses: Hyperlipidemia with target LDL less than 130         CONTINUE these medications which have NOT CHANGED    Details   cyanocobalamin (VITAMIN B-12) 500 MCG tablet Take 500 mcg by mouth daily      escitalopram (LEXAPRO) 10 MG tablet Take 10 mg by mouth daily      lisinopril (ZESTRIL) 40 MG tablet Take 40 mg by mouth daily      metoprolol succinate ER (TOPROL XL) 100 MG 24 hr tablet Take 100 mg by mouth daily      vitamin C (ASCORBIC ACID) 1000 MG TABS Take 1,000 mg by mouth daily      Vitamin D3 (CHOLECALCIFEROL) 125 MCG (5000 UT) tablet Take 125 mcg by mouth  daily      vitamin E 400 units TABS Take 400 Units by mouth daily         STOP taking these medications       aspirin 81 MG tablet Comments:   Reason for Stopping:         fexofenadine (ALLEGRA) 180 MG tablet Comments:   Reason for Stopping:             Allergies   Allergies   Allergen Reactions     Penicillin G Rash

## 2023-02-12 NOTE — DISCHARGE INSTRUCTIONS

## 2023-02-12 NOTE — PLAN OF CARE
Alert and oriented x 4. VS stable, on RA and no complaints of pain. Patient was discharged this afternoon with family as transport home. 4 new medications were filled at an outside pharmacy. I reviewed all DC paperwork, new medications, orders and appointments with the patient and family, all were able to verbalize understanding of teaching. All questions answered. All IVs were removed and patient had all belongings at the time of DC.

## 2023-02-13 ENCOUNTER — PATIENT OUTREACH (OUTPATIENT)
Dept: INTERNAL MEDICINE | Facility: CLINIC | Age: 58
End: 2023-02-13
Payer: COMMERCIAL

## 2023-02-13 LAB
ATRIAL RATE - MUSE: 100 BPM
ATRIAL RATE - MUSE: 147 BPM
ATRIAL RATE - MUSE: 99 BPM
DIASTOLIC BLOOD PRESSURE - MUSE: NORMAL MMHG
INTERPRETATION ECG - MUSE: NORMAL
P AXIS - MUSE: 51 DEGREES
P AXIS - MUSE: 79 DEGREES
P AXIS - MUSE: NORMAL DEGREES
PR INTERVAL - MUSE: 144 MS
PR INTERVAL - MUSE: 172 MS
PR INTERVAL - MUSE: 182 MS
QRS DURATION - MUSE: 140 MS
QRS DURATION - MUSE: 142 MS
QRS DURATION - MUSE: 142 MS
QT - MUSE: 366 MS
QT - MUSE: 388 MS
QT - MUSE: 398 MS
QTC - MUSE: 497 MS
QTC - MUSE: 513 MS
QTC - MUSE: 568 MS
R AXIS - MUSE: -38 DEGREES
R AXIS - MUSE: -38 DEGREES
R AXIS - MUSE: 12 DEGREES
SYSTOLIC BLOOD PRESSURE - MUSE: NORMAL MMHG
T AXIS - MUSE: 104 DEGREES
T AXIS - MUSE: 216 DEGREES
T AXIS - MUSE: 99 DEGREES
VENTRICULAR RATE- MUSE: 100 BPM
VENTRICULAR RATE- MUSE: 145 BPM
VENTRICULAR RATE- MUSE: 99 BPM

## 2023-02-13 RX ORDER — IOPAMIDOL 755 MG/ML
INJECTION, SOLUTION INTRAVASCULAR
Status: SHIPPED | OUTPATIENT
Start: 2023-02-10

## 2023-02-14 LAB
ATRIAL RATE - MUSE: 69 BPM
ATRIAL RATE - MUSE: 74 BPM
DIASTOLIC BLOOD PRESSURE - MUSE: NORMAL MMHG
DIASTOLIC BLOOD PRESSURE - MUSE: NORMAL MMHG
INTERPRETATION ECG - MUSE: NORMAL
INTERPRETATION ECG - MUSE: NORMAL
P AXIS - MUSE: 42 DEGREES
P AXIS - MUSE: 56 DEGREES
PR INTERVAL - MUSE: 160 MS
PR INTERVAL - MUSE: 174 MS
QRS DURATION - MUSE: 142 MS
QRS DURATION - MUSE: 144 MS
QT - MUSE: 442 MS
QT - MUSE: 456 MS
QTC - MUSE: 473 MS
QTC - MUSE: 506 MS
R AXIS - MUSE: -42 DEGREES
R AXIS - MUSE: 34 DEGREES
SYSTOLIC BLOOD PRESSURE - MUSE: NORMAL MMHG
SYSTOLIC BLOOD PRESSURE - MUSE: NORMAL MMHG
T AXIS - MUSE: 106 DEGREES
T AXIS - MUSE: 108 DEGREES
VENTRICULAR RATE- MUSE: 69 BPM
VENTRICULAR RATE- MUSE: 74 BPM

## 2023-02-15 ENCOUNTER — TELEPHONE (OUTPATIENT)
Dept: CARDIOLOGY | Facility: CLINIC | Age: 58
End: 2023-02-15
Payer: COMMERCIAL

## 2023-02-15 NOTE — TELEPHONE ENCOUNTER
Patient was admitted to [facility] on 2/8/23 with chest pain and new LBBB.    PMH: significant cardiac family history, anxiety.     Echo showed EF of 45-50%. Base-mid anteroseptal and inferoseptal hypokinesis. The right ventricle is mildly dilated. The right ventricular systolic function is normal. Moderate (46-55mmHg) pulmonary hypertension is present. No significant valve dysfunction.    2/10/23: Coronary angiogram via RFA showed single-vessel occlusive coronary artery disease with ulcerated proximal LAD lesion. Mild nonocclusive disease elsewhere. Successful PCI of proximal to mid LAD with placement of a 3.5 x 15 mm resolute Ed stent.    Course complicated by AF w/ RVR, now back in sinus with IV Metoprolol.    Pt was started on TAPT (ASA x 5 days then discontinue, continue Eliquis, Brilinta), Norvasc, Protonix, NTG. PTA Crestor was continued at time of discharge. Pt was discharged wearing  24 hr Holter Monitor.    Called patient to discuss any post hospital d/c questions, review medication changes, and confirm f/u appts. Patient denied any questions regarding new medications or changes to PTA medications.     RN confirmed with patient that he was d/c with an adequate supply of the antiplatelet Brilinta, and reminded of importance of taking without interruption.     Pt has an Rx for PRN SL Nitroglycerin.     Patient denied any SOB, chest pain, fever or light headedness. States yesterday was experiencing split second twinges of chest pain, but resolved today. Will continue to monitor.    RFA cardiac cath site is without bleeding, swelling, redness or signs of infection.     RN confirmed with patient that is scheduled for an OV on 2/28/23 at 1225 with MARYLOU Awais Verduzco at our [facility].    Cardiac rehab is scheduled on 2/27/23 at 1345 in [place].     Patient advised to call clinic with any cardiac related questions or concerns prior to this marylou't. Patient verbalized understanding and agreed with plan. JON Omalley,  [place].

## 2023-02-27 ENCOUNTER — HOSPITAL ENCOUNTER (OUTPATIENT)
Dept: CARDIAC REHAB | Facility: CLINIC | Age: 58
Discharge: HOME | End: 2023-02-27
Attending: STUDENT IN AN ORGANIZED HEALTH CARE EDUCATION/TRAINING PROGRAM
Payer: COMMERCIAL

## 2023-02-27 DIAGNOSIS — R94.39 ABNORMAL CARDIOVASCULAR STRESS TEST: ICD-10-CM

## 2023-02-27 DIAGNOSIS — E78.5 HYPERLIPIDEMIA WITH TARGET LDL LESS THAN 130: ICD-10-CM

## 2023-02-27 PROCEDURE — 93798 PHYS/QHP OP CAR RHAB W/ECG: CPT

## 2023-02-27 PROCEDURE — 93797 PHYS/QHP OP CAR RHAB WO ECG: CPT

## 2023-02-28 ENCOUNTER — OFFICE VISIT (OUTPATIENT)
Dept: CARDIOLOGY | Facility: CLINIC | Age: 58
End: 2023-02-28
Payer: COMMERCIAL

## 2023-02-28 VITALS
WEIGHT: 200.1 LBS | OXYGEN SATURATION: 98 % | DIASTOLIC BLOOD PRESSURE: 84 MMHG | HEART RATE: 70 BPM | BODY MASS INDEX: 28.65 KG/M2 | HEIGHT: 70 IN | SYSTOLIC BLOOD PRESSURE: 134 MMHG

## 2023-02-28 DIAGNOSIS — I10 HYPERTENSION, UNSPECIFIED TYPE: ICD-10-CM

## 2023-02-28 DIAGNOSIS — I48.0 PAROXYSMAL ATRIAL FIBRILLATION (H): ICD-10-CM

## 2023-02-28 DIAGNOSIS — I25.110 CORONARY ARTERY DISEASE INVOLVING NATIVE CORONARY ARTERY OF NATIVE HEART WITH UNSTABLE ANGINA PECTORIS (H): ICD-10-CM

## 2023-02-28 PROCEDURE — 99214 OFFICE O/P EST MOD 30 MIN: CPT | Performed by: PHYSICIAN ASSISTANT

## 2023-02-28 NOTE — PATIENT INSTRUCTIONS
Today's Plan:   1) Continue with cardiac rehab.   2) Follow up with a new cardiologist in a few months.     If you have questions or concerns please call my nurse team at [phone]     Scheduling phone number: [phone]  Reminder: Please bring in all current medications, over the counter supplements and vitamin bottles to your next appointment.    It was a pleasure seeing you today!

## 2023-02-28 NOTE — PROGRESS NOTES
Primary Cardiologist: He will need to establish care with a cardiologist    Reason for Visit: Hospital follow up    History of Present Illness:   Maximus is a pleasant 58-year-old male  who was admitted recently with unstable angina.  He received stenting to his LAD.  He also has hypertension, hyperlipidemia, anxiety, obesity, new mild cardiomyopathy likely due to his recent ACS event, probable FELICITA, and new onset of atrial fibrillation (s/p cardioversion; on rate control strategy as well as Eliquis for thromboembolic prophylaxis).  Of note, there was probable OM 2 lesion noted per Dr. Rubalcava's review of the angiogram images and recommendation was made to consider repeat ischemic evaluation as patient had persistent symptoms.    Maximus presents to clinic today with his spouse, stating he is doing better.  He had 1 episode of lightheadedness during cardiac rehab but his blood pressure has been quite stable with no significant dips.  He continues to have right shoulder pain which was one of his symptoms coming into the hospital but this is clearly exacerbated by deep palpation of his upper back and with movements.  He was also having symptoms of nausea, diaphoresis, and left arm tingling when he presented to the hospital and he tells me these symptoms have resolved.  He describes his right shoulder pain as more around his right scapula.  He denies its association with exertion.  He is also scheduled for sleep medicine evaluation tomorrow.  He feels his anxiety is under better control.  Overall he thinks he has more energy and his breathing has improved.  He denies any bleeding issues with Brilinta and Eliquis on board.    Assessment and Plan:  Maximus is a pleasant 58-year-old male with recent history of CAD (s/p PCI to LAD, on Brilinta), hypertension, hyperlipidemia, mild ischemic cardiomyopathy (LVEF around 45%; on beta-blocker and ACE inhibitor; felt to be likely stunning), recent diagnosis of atrial fibrillation  (back in normal sinus rhythm after cardioversion, on Eliquis and metoprolol), obesity, and anxiety.    Maximus's right shoulder pain appears to be quite atypical and more likely musculoskeletal in etiology.  His left-sided symptoms which were left arm tingling and achiness associated with nausea and diaphoresis have resolved completely.  At this time I have recommended he get in touch with his primary care provider for further evaluation and recommendations of his right shoulder discomfort. We did discuss proceeding with stress testing for reassurance, but patient declined this.  He is also not interested in repeat angiogram as his mother  from complications following her angiogram.     I will have him arrange follow-up in a few months to recheck his symptoms as well as to establish care with one of our our cardiologist here in [place].  We will repeat echocardiogram prior to that visit to see if his LVEF improves.  He will continue with Brilinta and Eliquis and rest of his medications the same.  He will continue with cardiac rehab.    60 minutes spent on the date of the encounter with chart review, patient visit, care coordination, and documentation.      This note was completed in part using Dragon voice recognition software. Although reviewed after completion, some word and grammatical errors may occur.    Orders this Visit:  Orders Placed This Encounter   Procedures     Follow-Up with Cardiology     Echocardiogram Complete     No orders of the defined types were placed in this encounter.    There are no discontinued medications.      Encounter Diagnoses   Name Primary?     Hypertension, unspecified type      Coronary artery disease involving native coronary artery of native heart with unstable angina pectoris (H)      Paroxysmal atrial fibrillation (H)        CURRENT MEDICATIONS:  Current Outpatient Medications   Medication Sig Dispense Refill     amLODIPine (NORVASC) 2.5 MG tablet Take 1 tablet (2.5 mg)  by mouth daily for 90 days 90 tablet 0     apixaban ANTICOAGULANT (ELIQUIS) 5 MG tablet Take 1 tablet (5 mg) by mouth 2 times daily for 30 days 60 tablet 0     cyanocobalamin (VITAMIN B-12) 500 MCG tablet Take 500 mcg by mouth daily       escitalopram (LEXAPRO) 10 MG tablet Take 10 mg by mouth daily       lisinopril (ZESTRIL) 40 MG tablet Take 40 mg by mouth daily       metoprolol succinate ER (TOPROL XL) 100 MG 24 hr tablet Take 100 mg by mouth daily       pantoprazole (PROTONIX) 40 MG EC tablet Take 1 tablet (40 mg) by mouth every morning (before breakfast) for 30 days 30 tablet 0     rosuvastatin (CRESTOR) 40 MG tablet Take 1 tablet (40 mg) by mouth daily 90 tablet 0     ticagrelor (BRILINTA) 90 MG tablet Take 1 tablet (90 mg) by mouth every 12 hours for 60 days 120 tablet 1     vitamin C (ASCORBIC ACID) 1000 MG TABS Take 1,000 mg by mouth daily       Vitamin D3 (CHOLECALCIFEROL) 125 MCG (5000 UT) tablet Take 125 mcg by mouth daily       vitamin E 400 units TABS Take 400 Units by mouth daily       aspirin (ASA) 81 MG EC tablet Take aspirin 81mg till 2/17/23 and stop (Patient not taking: Reported on 2/28/2023) 10 tablet 0     nitroGLYcerin (NITROSTAT) 0.4 MG sublingual tablet For chest pain place 1 tablet under the tongue every 5 minutes for 3 doses. If symptoms persist 5 minutes after 1st dose call 911. (Patient not taking: Reported on 2/28/2023) 20 tablet 0       ALLERGIES     Allergies   Allergen Reactions     Penicillin G Rash       PAST MEDICAL HISTORY:  Past Medical History:   Diagnosis Date     HTN (hypertension)      Hyperlipidemia LDL goal < 130      Nephrolithiasis        PAST SURGICAL HISTORY:  Past Surgical History:   Procedure Laterality Date     CV CORONARY ANGIOGRAM N/A 2/10/2023    Procedure: Coronary Angiogram;  Surgeon: Enrique Smith MD;  Location: [facility] CARDIAC CATH LAB     CV PCI N/A 2/10/2023    Procedure: Percutaneous Coronary Intervention;  Surgeon: Enrique Smith MD;   "Location:  HEART CARDIAC CATH LAB     HERNIA REPAIR, INGUINAL RT/LT         FAMILY HISTORY:  Family History   Problem Relation Age of Onset     Heart Disease Mother         MI     Heart Disease Father         MI, COPD     Arthritis Brother         lupus     Cancer Sister         ovarian cancer DM     Diabetes Sister        SOCIAL HISTORY:  Social History     Socioeconomic History     Marital status:      Spouse name: None     Number of children: 3     Years of education: None     Highest education level: None   Occupational History     Employer: [employer]   Tobacco Use     Smoking status: Never   Substance and Sexual Activity     Alcohol use: No     Comment: none     Drug use: No     Sexual activity: Yes     Partners: Female   Other Topics Concern     Caffeine Concern No     Comment: 3 sodas daily      Occupational Exposure No     Hobby Hazards No     Sleep Concern No     Stress Concern No     Weight Concern No     Special Diet No     Back Care No     Exercise Yes     Comment: 3 x week treadmil      Seat Belt Yes       Review of Systems:  Skin:  not assessed     Eyes:  not assessed    ENT:  not assessed    Respiratory:  Negative    Cardiovascular:    Positive for;lightheadedness  Gastroenterology: not assessed    Genitourinary:  not assessed    Musculoskeletal:  not assessed    Neurologic:  not assessed    Psychiatric:  not assessed    Heme/Lymph/Imm:  not assessed    Endocrine:  not assessed      Physical Exam:  Vitals: /84 (BP Location: Right arm, Patient Position: Sitting, Cuff Size: Adult Regular)   Pulse 70   Ht 1.765 m (5' 9.5\")   Wt 90.8 kg (200 lb 1.6 oz)   SpO2 98%   BMI 29.13 kg/m       GEN:  NAD  NECK: No JVD  C/V:  Regular rate and rhythm, no murmur, rub or gallop.  RESP: Clear to auscultation bilaterally.  GI: Abdomen soft, nontender, nondistended.   EXTREM: No pitting LE edema.   NEURO: Alert and oriented, cooperative. No obvious focal deficits.   PSYCH: Normal " affect.  MSK: Tenderness on deep palpation around his right scapula.  SKIN: Warm and dry.       Recent Lab Results:  LIPID RESULTS:  Lab Results   Component Value Date    CHOL 162 02/12/2023    CHOL 145 09/23/2014    HDL 39 (L) 02/12/2023    HDL 40 (L) 09/23/2014    LDL 93 02/12/2023    LDL 71 09/23/2014    TRIG 151 (H) 02/12/2023    TRIG 168 (H) 09/23/2014    CHOLHDLRATIO 3.6 09/23/2014       LIVER ENZYME RESULTS:  Lab Results   Component Value Date    AST 32 02/08/2023    AST 17 09/23/2014    ALT 23 02/08/2023    ALT 29 09/23/2014       CBC RESULTS:  Lab Results   Component Value Date    WBC 7.8 02/12/2023    WBC 9.2 10/28/2014    RBC 5.03 02/12/2023    RBC 4.91 10/28/2014    HGB 14.8 02/12/2023    HGB 13.8 10/28/2014    HCT 43.8 02/12/2023    HCT 41.3 10/28/2014    MCV 87 02/12/2023    MCV 84 10/28/2014    MCH 29.4 02/12/2023    MCH 28.1 10/28/2014    MCHC 33.8 02/12/2023    MCHC 33.4 10/28/2014    RDW 12.3 02/12/2023    RDW 12.7 10/28/2014     02/12/2023     10/28/2014       BMP RESULTS:  Lab Results   Component Value Date     02/10/2023     10/28/2014    POTASSIUM 3.4 02/12/2023    POTASSIUM 3.6 10/28/2014    CHLORIDE 100 02/10/2023    CHLORIDE 107 10/28/2014    CO2 26 02/10/2023    CO2 29 10/28/2014    ANIONGAP 13 02/10/2023    ANIONGAP 4 10/28/2014    GLC 84 02/10/2023    GLC 86 02/10/2023     (H) 10/28/2014    BUN 14.2 02/10/2023    BUN 14 10/28/2014    CR 0.82 02/10/2023    CR 0.89 10/28/2014    GFRESTIMATED >90 02/10/2023    GFRESTIMATED >90  Non  GFR Calc   10/28/2014    GFRESTBLACK >90   GFR Calc   10/28/2014    DAVID 10.0 02/10/2023    DAVID 9.1 10/28/2014        A1C RESULTS:  Lab Results   Component Value Date    A1C 7.2 (H) 02/09/2023       INR RESULTS:  No results found for: INR        Awais Davila PA-C   February 28, 2023

## 2023-02-28 NOTE — LETTER
2/28/2023    Dariana Buitrago, ALTA [facility]  [facility] [address] [facility] [address]    RE: Maximus Harris JrLeandra       Dear Colleague,     I had the pleasure of seeing Maximus Harris Jr. in the [facility].  Primary Cardiologist: He will need to establish care with a cardiologist    Reason for Visit: Hospital follow up    History of Present Illness:   Maximus is a pleasant 58-year-old male  who was admitted recently with unstable angina.  He received stenting to his LAD.  He also has hypertension, hyperlipidemia, anxiety, obesity, new mild cardiomyopathy likely due to his recent ACS event, probable FELICITA, and new onset of atrial fibrillation (s/p cardioversion; on rate control strategy as well as Eliquis for thromboembolic prophylaxis).  Of note, there was probable OM 2 lesion noted per Dr. Rubalcava's review of the angiogram images and recommendation was made to consider repeat ischemic evaluation as patient had persistent symptoms.    Maximus presents to clinic today with his spouse, stating he is doing better.  He had 1 episode of lightheadedness during cardiac rehab but his blood pressure has been quite stable with no significant dips.  He continues to have right shoulder pain which was one of his symptoms coming into the hospital but this is clearly exacerbated by deep palpation of his upper back and with movements.  He was also having symptoms of nausea, diaphoresis, and left arm tingling when he presented to the hospital and he tells me these symptoms have resolved.  He describes his right shoulder pain as more around his right scapula.  He denies its association with exertion.  He is also scheduled for sleep medicine evaluation tomorrow.  He feels his anxiety is under better control.  Overall he thinks he has more energy and his breathing has improved.  He denies any bleeding issues with Brilinta and Eliquis on board.    Assessment and Plan:  Maximus is a pleasant  58-year-old male with recent history of CAD (s/p PCI to LAD, on Brilinta), hypertension, hyperlipidemia, mild ischemic cardiomyopathy (LVEF around 45%; on beta-blocker and ACE inhibitor; felt to be likely stunning), recent diagnosis of atrial fibrillation (back in normal sinus rhythm after cardioversion, on Eliquis and metoprolol), obesity, and anxiety.    Maximus's right shoulder pain appears to be quite atypical and more likely musculoskeletal in etiology.  His left-sided symptoms which were left arm tingling and achiness associated with nausea and diaphoresis have resolved completely.  At this time I have recommended he get in touch with his primary care provider for further evaluation and recommendations of his right shoulder discomfort. We did discuss proceeding with stress testing for reassurance, but patient declined this.  He is also not interested in repeat angiogram as his mother  from complications following her angiogram.     I will have him arrange follow-up in a few months to recheck his symptoms as well as to establish care with one of our our cardiologist here in [place].  We will repeat echocardiogram prior to that visit to see if his LVEF improves.  He will continue with Brilinta and Eliquis and rest of his medications the same.  He will continue with cardiac rehab.    60 minutes spent on the date of the encounter with chart review, patient visit, care coordination, and documentation.      This note was completed in part using Dragon voice recognition software. Although reviewed after completion, some word and grammatical errors may occur.    Orders this Visit:  Orders Placed This Encounter   Procedures     Follow-Up with Cardiology     Echocardiogram Complete     No orders of the defined types were placed in this encounter.    There are no discontinued medications.      Encounter Diagnoses   Name Primary?     Hypertension, unspecified type      Coronary artery disease involving native  coronary artery of native heart with unstable angina pectoris (H)      Paroxysmal atrial fibrillation (H)        CURRENT MEDICATIONS:  Current Outpatient Medications   Medication Sig Dispense Refill     amLODIPine (NORVASC) 2.5 MG tablet Take 1 tablet (2.5 mg) by mouth daily for 90 days 90 tablet 0     apixaban ANTICOAGULANT (ELIQUIS) 5 MG tablet Take 1 tablet (5 mg) by mouth 2 times daily for 30 days 60 tablet 0     cyanocobalamin (VITAMIN B-12) 500 MCG tablet Take 500 mcg by mouth daily       escitalopram (LEXAPRO) 10 MG tablet Take 10 mg by mouth daily       lisinopril (ZESTRIL) 40 MG tablet Take 40 mg by mouth daily       metoprolol succinate ER (TOPROL XL) 100 MG 24 hr tablet Take 100 mg by mouth daily       pantoprazole (PROTONIX) 40 MG EC tablet Take 1 tablet (40 mg) by mouth every morning (before breakfast) for 30 days 30 tablet 0     rosuvastatin (CRESTOR) 40 MG tablet Take 1 tablet (40 mg) by mouth daily 90 tablet 0     ticagrelor (BRILINTA) 90 MG tablet Take 1 tablet (90 mg) by mouth every 12 hours for 60 days 120 tablet 1     vitamin C (ASCORBIC ACID) 1000 MG TABS Take 1,000 mg by mouth daily       Vitamin D3 (CHOLECALCIFEROL) 125 MCG (5000 UT) tablet Take 125 mcg by mouth daily       vitamin E 400 units TABS Take 400 Units by mouth daily       aspirin (ASA) 81 MG EC tablet Take aspirin 81mg till 2/17/23 and stop (Patient not taking: Reported on 2/28/2023) 10 tablet 0     nitroGLYcerin (NITROSTAT) 0.4 MG sublingual tablet For chest pain place 1 tablet under the tongue every 5 minutes for 3 doses. If symptoms persist 5 minutes after 1st dose call 911. (Patient not taking: Reported on 2/28/2023) 20 tablet 0       ALLERGIES     Allergies   Allergen Reactions     Penicillin G Rash       PAST MEDICAL HISTORY:  Past Medical History:   Diagnosis Date     HTN (hypertension)      Hyperlipidemia LDL goal < 130      Nephrolithiasis        PAST SURGICAL HISTORY:  Past Surgical History:   Procedure Laterality Date  "    CV CORONARY ANGIOGRAM N/A 2/10/2023    Procedure: Coronary Angiogram;  Surgeon: Enrique Smith MD;  Location:  HEART CARDIAC CATH LAB     CV PCI N/A 2/10/2023    Procedure: Percutaneous Coronary Intervention;  Surgeon: Enrique Smith MD;  Location:  HEART CARDIAC CATH LAB     HERNIA REPAIR, INGUINAL RT/LT         FAMILY HISTORY:  Family History   Problem Relation Age of Onset     Heart Disease Mother         MI     Heart Disease Father         MI, COPD     Arthritis Brother         lupus     Cancer Sister         ovarian cancer DM     Diabetes Sister        SOCIAL HISTORY:  Social History     Socioeconomic History     Marital status:      Spouse name: None     Number of children: 3     Years of education: None     Highest education level: None   Occupational History     Employer: [employer]   Tobacco Use     Smoking status: Never   Substance and Sexual Activity     Alcohol use: No     Comment: none     Drug use: No     Sexual activity: Yes     Partners: Female   Other Topics Concern     Caffeine Concern No     Comment: 3 sodas daily      Occupational Exposure No     Hobby Hazards No     Sleep Concern No     Stress Concern No     Weight Concern No     Special Diet No     Back Care No     Exercise Yes     Comment: 3 x week treadmil      Seat Belt Yes       Review of Systems:  Skin:  not assessed     Eyes:  not assessed    ENT:  not assessed    Respiratory:  Negative    Cardiovascular:    Positive for;lightheadedness  Gastroenterology: not assessed    Genitourinary:  not assessed    Musculoskeletal:  not assessed    Neurologic:  not assessed    Psychiatric:  not assessed    Heme/Lymph/Imm:  not assessed    Endocrine:  not assessed      Physical Exam:  Vitals: /84 (BP Location: Right arm, Patient Position: Sitting, Cuff Size: Adult Regular)   Pulse 70   Ht 1.765 m (5' 9.5\")   Wt 90.8 kg (200 lb 1.6 oz)   SpO2 98%   BMI 29.13 kg/m       GEN:  NAD  NECK: No " JVD  C/V:  Regular rate and rhythm, no murmur, rub or gallop.  RESP: Clear to auscultation bilaterally.  GI: Abdomen soft, nontender, nondistended.   EXTREM: No pitting LE edema.   NEURO: Alert and oriented, cooperative. No obvious focal deficits.   PSYCH: Normal affect.  MSK: Tenderness on deep palpation around his right scapula.  SKIN: Warm and dry.       Recent Lab Results:  LIPID RESULTS:  Lab Results   Component Value Date    CHOL 162 02/12/2023    CHOL 145 09/23/2014    HDL 39 (L) 02/12/2023    HDL 40 (L) 09/23/2014    LDL 93 02/12/2023    LDL 71 09/23/2014    TRIG 151 (H) 02/12/2023    TRIG 168 (H) 09/23/2014    CHOLHDLRATIO 3.6 09/23/2014       LIVER ENZYME RESULTS:  Lab Results   Component Value Date    AST 32 02/08/2023    AST 17 09/23/2014    ALT 23 02/08/2023    ALT 29 09/23/2014       CBC RESULTS:  Lab Results   Component Value Date    WBC 7.8 02/12/2023    WBC 9.2 10/28/2014    RBC 5.03 02/12/2023    RBC 4.91 10/28/2014    HGB 14.8 02/12/2023    HGB 13.8 10/28/2014    HCT 43.8 02/12/2023    HCT 41.3 10/28/2014    MCV 87 02/12/2023    MCV 84 10/28/2014    MCH 29.4 02/12/2023    MCH 28.1 10/28/2014    MCHC 33.8 02/12/2023    MCHC 33.4 10/28/2014    RDW 12.3 02/12/2023    RDW 12.7 10/28/2014     02/12/2023     10/28/2014       BMP RESULTS:  Lab Results   Component Value Date     02/10/2023     10/28/2014    POTASSIUM 3.4 02/12/2023    POTASSIUM 3.6 10/28/2014    CHLORIDE 100 02/10/2023    CHLORIDE 107 10/28/2014    CO2 26 02/10/2023    CO2 29 10/28/2014    ANIONGAP 13 02/10/2023    ANIONGAP 4 10/28/2014    GLC 84 02/10/2023    GLC 86 02/10/2023     (H) 10/28/2014    BUN 14.2 02/10/2023    BUN 14 10/28/2014    CR 0.82 02/10/2023    CR 0.89 10/28/2014    GFRESTIMATED >90 02/10/2023    GFRESTIMATED >90  Non  GFR Calc   10/28/2014    GFRESTBLACK >90   GFR Calc   10/28/2014    DAVID 10.0 02/10/2023    DAVID 9.1 10/28/2014        A1C RESULTS:  Lab  Results   Component Value Date    A1C 7.2 (H) 02/09/2023       INR RESULTS:  No results found for: INR        Awais Davila PA-C   February 28, 2023     Thank you for allowing me to participate in the care of your patient.      Sincerely,     Awais Davila PA-C     [facility]  cc:   Gordon Bradshaw MD  [address] [id number]

## 2023-03-01 ENCOUNTER — HOSPITAL ENCOUNTER (OUTPATIENT)
Dept: CARDIAC REHAB | Facility: CLINIC | Age: 58
Discharge: HOME | End: 2023-03-01
Attending: STUDENT IN AN ORGANIZED HEALTH CARE EDUCATION/TRAINING PROGRAM
Payer: COMMERCIAL

## 2023-03-01 PROCEDURE — 93798 PHYS/QHP OP CAR RHAB W/ECG: CPT | Performed by: REHABILITATION PRACTITIONER

## 2023-03-09 ENCOUNTER — HOSPITAL ENCOUNTER (OUTPATIENT)
Dept: CARDIAC REHAB | Facility: CLINIC | Age: 58
Discharge: HOME | End: 2023-03-09
Attending: STUDENT IN AN ORGANIZED HEALTH CARE EDUCATION/TRAINING PROGRAM
Payer: COMMERCIAL

## 2023-03-09 PROCEDURE — 93798 PHYS/QHP OP CAR RHAB W/ECG: CPT | Performed by: REHABILITATION PRACTITIONER

## 2023-03-10 ENCOUNTER — HOSPITAL ENCOUNTER (OUTPATIENT)
Dept: CARDIAC REHAB | Facility: CLINIC | Age: 58
Discharge: HOME | End: 2023-03-10
Attending: STUDENT IN AN ORGANIZED HEALTH CARE EDUCATION/TRAINING PROGRAM
Payer: COMMERCIAL

## 2023-03-10 PROCEDURE — 93798 PHYS/QHP OP CAR RHAB W/ECG: CPT | Performed by: REHABILITATION PRACTITIONER

## 2023-03-13 ENCOUNTER — HOSPITAL ENCOUNTER (OUTPATIENT)
Dept: CARDIAC REHAB | Facility: CLINIC | Age: 58
Discharge: HOME | End: 2023-03-13
Attending: STUDENT IN AN ORGANIZED HEALTH CARE EDUCATION/TRAINING PROGRAM
Payer: COMMERCIAL

## 2023-03-13 PROCEDURE — 93798 PHYS/QHP OP CAR RHAB W/ECG: CPT | Performed by: REHABILITATION PRACTITIONER

## 2023-03-14 ENCOUNTER — HOSPITAL ENCOUNTER (OUTPATIENT)
Dept: CARDIAC REHAB | Facility: CLINIC | Age: 58
Discharge: HOME | End: 2023-03-14
Attending: STUDENT IN AN ORGANIZED HEALTH CARE EDUCATION/TRAINING PROGRAM
Payer: COMMERCIAL

## 2023-03-14 PROCEDURE — 93798 PHYS/QHP OP CAR RHAB W/ECG: CPT | Performed by: REHABILITATION PRACTITIONER

## 2023-03-16 ENCOUNTER — HOSPITAL ENCOUNTER (OUTPATIENT)
Dept: CARDIAC REHAB | Facility: CLINIC | Age: 58
Discharge: HOME | End: 2023-03-16
Attending: STUDENT IN AN ORGANIZED HEALTH CARE EDUCATION/TRAINING PROGRAM
Payer: COMMERCIAL

## 2023-03-16 PROCEDURE — 93798 PHYS/QHP OP CAR RHAB W/ECG: CPT | Performed by: REHABILITATION PRACTITIONER

## 2023-03-20 ENCOUNTER — HOSPITAL ENCOUNTER (OUTPATIENT)
Dept: CARDIAC REHAB | Facility: CLINIC | Age: 58
Discharge: HOME | End: 2023-03-20
Attending: STUDENT IN AN ORGANIZED HEALTH CARE EDUCATION/TRAINING PROGRAM
Payer: COMMERCIAL

## 2023-03-20 PROCEDURE — 93798 PHYS/QHP OP CAR RHAB W/ECG: CPT | Performed by: CLINICAL EXERCISE PHYSIOLOGIST

## 2023-03-23 ENCOUNTER — HOSPITAL ENCOUNTER (OUTPATIENT)
Dept: CARDIAC REHAB | Facility: CLINIC | Age: 58
Discharge: HOME | End: 2023-03-23
Attending: STUDENT IN AN ORGANIZED HEALTH CARE EDUCATION/TRAINING PROGRAM
Payer: COMMERCIAL

## 2023-03-23 PROCEDURE — 93798 PHYS/QHP OP CAR RHAB W/ECG: CPT | Performed by: REHABILITATION PRACTITIONER

## 2023-03-24 ENCOUNTER — HOSPITAL ENCOUNTER (OUTPATIENT)
Dept: CARDIAC REHAB | Facility: CLINIC | Age: 58
Discharge: HOME | End: 2023-03-24
Attending: STUDENT IN AN ORGANIZED HEALTH CARE EDUCATION/TRAINING PROGRAM
Payer: COMMERCIAL

## 2023-03-24 PROCEDURE — 93798 PHYS/QHP OP CAR RHAB W/ECG: CPT | Performed by: REHABILITATION PRACTITIONER

## 2023-03-27 ENCOUNTER — HOSPITAL ENCOUNTER (OUTPATIENT)
Dept: CARDIAC REHAB | Facility: CLINIC | Age: 58
Discharge: HOME | End: 2023-03-27
Attending: STUDENT IN AN ORGANIZED HEALTH CARE EDUCATION/TRAINING PROGRAM
Payer: COMMERCIAL

## 2023-03-27 PROCEDURE — 93798 PHYS/QHP OP CAR RHAB W/ECG: CPT | Performed by: CLINICAL EXERCISE PHYSIOLOGIST

## 2023-03-28 ENCOUNTER — HOSPITAL ENCOUNTER (OUTPATIENT)
Dept: CARDIAC REHAB | Facility: CLINIC | Age: 58
Discharge: HOME | End: 2023-03-28
Attending: STUDENT IN AN ORGANIZED HEALTH CARE EDUCATION/TRAINING PROGRAM
Payer: COMMERCIAL

## 2023-03-28 PROCEDURE — 93798 PHYS/QHP OP CAR RHAB W/ECG: CPT

## 2023-03-30 ENCOUNTER — HOSPITAL ENCOUNTER (OUTPATIENT)
Dept: CARDIAC REHAB | Facility: CLINIC | Age: 58
Discharge: HOME | End: 2023-03-30
Attending: STUDENT IN AN ORGANIZED HEALTH CARE EDUCATION/TRAINING PROGRAM
Payer: COMMERCIAL

## 2023-03-30 PROCEDURE — 93798 PHYS/QHP OP CAR RHAB W/ECG: CPT

## 2023-04-03 ENCOUNTER — HOSPITAL ENCOUNTER (OUTPATIENT)
Dept: CARDIAC REHAB | Facility: CLINIC | Age: 58
Discharge: HOME | End: 2023-04-03
Attending: STUDENT IN AN ORGANIZED HEALTH CARE EDUCATION/TRAINING PROGRAM
Payer: COMMERCIAL

## 2023-04-03 PROCEDURE — 93797 PHYS/QHP OP CAR RHAB WO ECG: CPT | Performed by: REHABILITATION PRACTITIONER

## 2023-04-03 PROCEDURE — 93798 PHYS/QHP OP CAR RHAB W/ECG: CPT | Performed by: REHABILITATION PRACTITIONER

## 2023-04-03 PROCEDURE — 93797 PHYS/QHP OP CAR RHAB WO ECG: CPT

## 2023-04-04 ENCOUNTER — HOSPITAL ENCOUNTER (OUTPATIENT)
Dept: CARDIAC REHAB | Facility: CLINIC | Age: 58
Discharge: HOME | End: 2023-04-04
Attending: STUDENT IN AN ORGANIZED HEALTH CARE EDUCATION/TRAINING PROGRAM
Payer: COMMERCIAL

## 2023-04-04 PROCEDURE — 93798 PHYS/QHP OP CAR RHAB W/ECG: CPT

## 2023-04-06 ENCOUNTER — HOSPITAL ENCOUNTER (OUTPATIENT)
Dept: CARDIAC REHAB | Facility: CLINIC | Age: 58
Discharge: HOME | End: 2023-04-06
Attending: STUDENT IN AN ORGANIZED HEALTH CARE EDUCATION/TRAINING PROGRAM
Payer: COMMERCIAL

## 2023-04-06 PROCEDURE — 93798 PHYS/QHP OP CAR RHAB W/ECG: CPT | Performed by: REHABILITATION PRACTITIONER

## 2023-04-10 ENCOUNTER — HOSPITAL ENCOUNTER (OUTPATIENT)
Dept: CARDIAC REHAB | Facility: CLINIC | Age: 58
Discharge: HOME | End: 2023-04-10
Attending: STUDENT IN AN ORGANIZED HEALTH CARE EDUCATION/TRAINING PROGRAM
Payer: COMMERCIAL

## 2023-04-10 PROCEDURE — 93798 PHYS/QHP OP CAR RHAB W/ECG: CPT | Performed by: OCCUPATIONAL THERAPIST

## 2023-04-11 ENCOUNTER — HOSPITAL ENCOUNTER (OUTPATIENT)
Dept: CARDIAC REHAB | Facility: CLINIC | Age: 58
Discharge: HOME | End: 2023-04-11
Attending: STUDENT IN AN ORGANIZED HEALTH CARE EDUCATION/TRAINING PROGRAM
Payer: COMMERCIAL

## 2023-04-11 PROCEDURE — 93798 PHYS/QHP OP CAR RHAB W/ECG: CPT

## 2023-04-13 ENCOUNTER — HOSPITAL ENCOUNTER (OUTPATIENT)
Dept: CARDIAC REHAB | Facility: CLINIC | Age: 58
Discharge: HOME | End: 2023-04-13
Attending: STUDENT IN AN ORGANIZED HEALTH CARE EDUCATION/TRAINING PROGRAM
Payer: COMMERCIAL

## 2023-04-13 PROCEDURE — 93798 PHYS/QHP OP CAR RHAB W/ECG: CPT

## 2023-04-30 ENCOUNTER — HEALTH MAINTENANCE LETTER (OUTPATIENT)
Age: 58
End: 2023-04-30

## 2023-05-16 ENCOUNTER — HOSPITAL ENCOUNTER (OUTPATIENT)
Dept: CARDIOLOGY | Facility: CLINIC | Age: 58
Discharge: HOME | End: 2023-05-16
Attending: PHYSICIAN ASSISTANT | Admitting: PHYSICIAN ASSISTANT
Payer: COMMERCIAL

## 2023-05-16 DIAGNOSIS — I48.0 PAROXYSMAL ATRIAL FIBRILLATION (H): ICD-10-CM

## 2023-05-16 DIAGNOSIS — I10 HYPERTENSION, UNSPECIFIED TYPE: ICD-10-CM

## 2023-05-16 DIAGNOSIS — I25.110 CORONARY ARTERY DISEASE INVOLVING NATIVE CORONARY ARTERY OF NATIVE HEART WITH UNSTABLE ANGINA PECTORIS (H): ICD-10-CM

## 2023-05-16 LAB — BI-PLANE LVEF ECHO: NORMAL

## 2023-05-16 PROCEDURE — 93306 TTE W/DOPPLER COMPLETE: CPT | Mod: 26 | Performed by: INTERNAL MEDICINE

## 2023-05-16 PROCEDURE — 93306 TTE W/DOPPLER COMPLETE: CPT

## 2023-05-16 PROCEDURE — 255N000002 HC RX 255 OP 636: Performed by: PHYSICIAN ASSISTANT

## 2023-05-16 RX ADMIN — HUMAN ALBUMIN MICROSPHERES AND PERFLUTREN 3 ML: 10; .22 INJECTION, SOLUTION INTRAVENOUS at 15:16

## 2023-05-22 ENCOUNTER — OFFICE VISIT (OUTPATIENT)
Dept: CARDIOLOGY | Facility: CLINIC | Age: 58
End: 2023-05-22
Attending: PHYSICIAN ASSISTANT
Payer: COMMERCIAL

## 2023-05-22 VITALS
OXYGEN SATURATION: 96 % | HEART RATE: 68 BPM | HEIGHT: 70 IN | DIASTOLIC BLOOD PRESSURE: 72 MMHG | WEIGHT: 196 LBS | BODY MASS INDEX: 28.06 KG/M2 | SYSTOLIC BLOOD PRESSURE: 112 MMHG

## 2023-05-22 DIAGNOSIS — E11.9 TYPE 2 DIABETES MELLITUS WITHOUT COMPLICATION, WITHOUT LONG-TERM CURRENT USE OF INSULIN (H): Primary | ICD-10-CM

## 2023-05-22 DIAGNOSIS — I10 PRIMARY HYPERTENSION: ICD-10-CM

## 2023-05-22 DIAGNOSIS — E78.2 MIXED HYPERLIPIDEMIA: ICD-10-CM

## 2023-05-22 DIAGNOSIS — I48.0 PAROXYSMAL ATRIAL FIBRILLATION (H): ICD-10-CM

## 2023-05-22 DIAGNOSIS — I25.10 CORONARY ARTERY DISEASE INVOLVING NATIVE CORONARY ARTERY OF NATIVE HEART WITHOUT ANGINA PECTORIS: ICD-10-CM

## 2023-05-22 DIAGNOSIS — I50.42 CHRONIC COMBINED SYSTOLIC AND DIASTOLIC HEART FAILURE (H): ICD-10-CM

## 2023-05-22 DIAGNOSIS — I25.5 ISCHEMIC CARDIOMYOPATHY: ICD-10-CM

## 2023-05-22 PROCEDURE — 99215 OFFICE O/P EST HI 40 MIN: CPT | Performed by: INTERNAL MEDICINE

## 2023-05-22 PROCEDURE — 93000 ELECTROCARDIOGRAM COMPLETE: CPT | Performed by: INTERNAL MEDICINE

## 2023-05-22 NOTE — LETTER
"5/22/2023    Dariana Buitrago, APRN [facility] [address] [address]    RE: Maximus Harris        Dear Colleague,     I had the pleasure of seeing Maximus Harris Jr. in the [facility].  CARDIOLOGY CLINIC CONSULTATION      REASON FOR CONSULT:   CAD s/p LAD PCI, establish care    PRIMARY CARE PHYSICIAN:  Dariana Buitrago        History of Present Illness   Maximus Harris Jr. is an extremely pleasant 58 year old male here as a new patient to establish care.  His medical history is significant for CAD c/b unstable angina s/p JUAN CARLOS x1-mid LAD in 2/2023 with residual moderate-severe stenosis of a relatively small OM 2 that has been medically managed, paroxysmal AFL/A. tach, chronic HF with mildly reduced EF, ischemic cardiomyopathy, chronic LBBB, hypertension, dyslipidemia, non-insulin-dependent DM2, and FELICITA on CPAP.  His family history is significant for his older brother having significant coronary disease with prior stents and bypass surgery.  Patient is a never smoker.    Since his LAD PCI in 2/2023, he reports that he has been doing well with no exertional chest discomfort or shortness of breath.  He completed cardiac rehab and since then has been doing regular aerobic exercise and occasional weightlifting without issue.  No major bleeding issues on the Eliquis/Brilinta.  He has been tolerating Crestor 40 mg daily well without issue.  He does apparently need a tooth extracted due to a \"dead root\", but this is asymptomatic.    His most recent labs are from 2/12/2023, showing total cholesterol 162, HDL 39, LDL 93, triglycerides 151, potassium 3.4, normal hemoglobin and platelets.  His most recent echocardiogram was from 5/16/2023, showing an LVEF of 45-50% with septal wall hypokinesis and no significant valve disease, roughly unchanged from 2/2023 TTE.  His most recent ischemic evaluation was the coronary angiogram/PCI from 2/2023 described above (I " personally reviewed the images of this during his visit today).  His ECG in clinic today showed sinus rhythm with LBBB.      Assessment & Plan     CAD c/b unstable angina s/p JUAN CARLOS x1-mid LAD in 2/2023  Residual moderate-severe stenosis of a relatively small OM 2, medically managed, CCS 0 angina  Paroxysmal AFL/A. tach, infrequent and mildly symptomatic   Chronic HF with mildly reduced EF (LVEF 45-50% on 5/2023 TTE), euvolemic, NYHA II  Ischemic cardiomyopathy  Chronic LBBB  Hypertension, well controlled   Dyslipidemia  Non-insulin-dependent DM2  FELICITA on CPAP  Strong family history of CAD  Never smoker        It was a pleasure to meet with Maximus in clinic today.  I am glad to hear he is feeling well from a cardiac standpoint.  We discussed multiple issues today related to his coronary disease, atrial flutter/atrial tachycardia, and a chest/ischemic cardiomyopathy.  Ultimately, I recommended the following:      -Continue medical management of small OM 2 lesion unless new anginal symptoms arise.  -Continue dual antithrombotic therapy with Eliquis/Brilinta for 1 year (ending 2/2024), then transition to Eliquis monotherapy  -Check fasting lipid profile now  -Continue Crestor 40 mg daily for now; if LDL not at goal of 70 or less, will add ezetimibe  -Discussed strategies for identifying occasional episodes of palpitations/racing heartbeat.  Given very infrequent nature, Zio patch/event monitor is unlikely to be effective; Apple watch or alternative may be best option.  -Continue lisinopril 40 mg daily  -Continue Toprol- mg daily  -Continue amlodipine 2.5 mg daily  -Will message pharmacy liaison regarding coverage for SGLT2 inhibitors, and would recommend starting 1 if reasonably well covered        Follow-up: February 2023, with labs beforehand, or sooner if new medications started or new issues arise      On the date of the patient's visit, I spent a total of 70 minutes reviewing the patient's chart; interviewing,  examining, and counseling the patient; coordinating with other providers as necessary, entering orders, and documenting in the medical chart.      Kendrick Hannah MD  Interventional Cardiology  May 22, 2023        Medications   Current Outpatient Medications   Medication    amLODIPine (NORVASC) 2.5 MG tablet    apixaban ANTICOAGULANT (ELIQUIS) 5 MG tablet    cyanocobalamin (VITAMIN B-12) 500 MCG tablet    escitalopram (LEXAPRO) 10 MG tablet    lisinopril (ZESTRIL) 40 MG tablet    metoprolol succinate ER (TOPROL XL) 100 MG 24 hr tablet    nitroGLYcerin (NITROSTAT) 0.4 MG sublingual tablet    rosuvastatin (CRESTOR) 40 MG tablet    ticagrelor (BRILINTA) 90 MG tablet    vitamin C (ASCORBIC ACID) 1000 MG TABS    Vitamin D3 (CHOLECALCIFEROL) 125 MCG (5000 UT) tablet    vitamin E 400 units TABS    aspirin (ASA) 81 MG EC tablet     No current facility-administered medications for this visit.     Facility-Administered Medications Ordered in Other Visits   Medication    iopamidol (ISOVUE-370) solution     Allergies   Allergies   Allergen Reactions    Penicillin G Rash         Physical Exam       BP: 112/72 Pulse: 68     SpO2: 96 %      Vital Signs with Ranges  Pulse:  [68] 68  BP: (112)/(72) 112/72  SpO2:  [96 %] 96 %  196 lbs 0 oz    Constitutional: Well-appearing, no acute distress  Respiratory: Normal respiratory effort, CTAB  Cardiovascular: RRR, no m/r/g.  JVP < 7 cm H2O.  There is no LE edema.  Normal carotid upstrokes, no carotid bruits.      Thank you for allowing me to participate in the care of your patient.      Sincerely,     Kendrick Hannah MD     [facility]  cc:   Awais Davila PA-C  [address] [place] [address]

## 2023-05-22 NOTE — PATIENT INSTRUCTIONS
Blood Thinners:  Continue Eliquis and Brilinta until February 2024, then STOP Brilinta and continue Eliquis alone.    Cholesterol Medication:  We will check a fasting lab draw to look at your cholesterol.  If you are not at goal, we will add a second medicine (ezetimibe) on top of your Crestor.    Heart Failure Medication:  Continue your metoprolol and lisinopril, and we will have the pharmacist check on your insurance coverage for an additional medication (SGLT2 inhibitors).

## 2023-05-22 NOTE — PROGRESS NOTES
"CARDIOLOGY CLINIC CONSULTATION      REASON FOR CONSULT:   CAD s/p LAD PCI, establish care    PRIMARY CARE PHYSICIAN:  Dariana Buitrago        History of Present Illness   Maximus Harris Jr. is an extremely pleasant 58 year old male here as a new patient to establish care.  His medical history is significant for CAD c/b unstable angina s/p JUAN CARLOS x1-mid LAD in 2/2023 with residual moderate-severe stenosis of a relatively small OM 2 that has been medically managed, paroxysmal AFL/A. tach, chronic HF with mildly reduced EF, ischemic cardiomyopathy, chronic LBBB, hypertension, dyslipidemia, non-insulin-dependent DM2, and FELICITA on CPAP.  His family history is significant for his older brother having significant coronary disease with prior stents and bypass surgery.  Patient is a never smoker.    Since his LAD PCI in 2/2023, he reports that he has been doing well with no exertional chest discomfort or shortness of breath.  He completed cardiac rehab and since then has been doing regular aerobic exercise and occasional weightlifting without issue.  No major bleeding issues on the Eliquis/Brilinta.  He has been tolerating Crestor 40 mg daily well without issue.  He does apparently need a tooth extracted due to a \"dead root\", but this is asymptomatic.    His most recent labs are from 2/12/2023, showing total cholesterol 162, HDL 39, LDL 93, triglycerides 151, potassium 3.4, normal hemoglobin and platelets.  His most recent echocardiogram was from 5/16/2023, showing an LVEF of 45-50% with septal wall hypokinesis and no significant valve disease, roughly unchanged from 2/2023 TTE.  His most recent ischemic evaluation was the coronary angiogram/PCI from 2/2023 described above (I personally reviewed the images of this during his visit today).  His ECG in clinic today showed sinus rhythm with LBBB.      Assessment & Plan     1. CAD c/b unstable angina s/p JUAN CARLOS x1-mid LAD in 2/2023  2. Residual moderate-severe stenosis of a " relatively small OM 2, medically managed, CCS 0 angina  3. Paroxysmal AFL/A. tach, infrequent and mildly symptomatic   4. Chronic HF with mildly reduced EF (LVEF 45-50% on 5/2023 TTE), euvolemic, NYHA II  5. Ischemic cardiomyopathy  6. Chronic LBBB  7. Hypertension, well controlled   8. Dyslipidemia  9. Non-insulin-dependent DM2  10. FELICITA on CPAP  11. Strong family history of CAD  12. Never smoker        It was a pleasure to meet with Maximus in clinic today.  I am glad to hear he is feeling well from a cardiac standpoint.  We discussed multiple issues today related to his coronary disease, atrial flutter/atrial tachycardia, and a chest/ischemic cardiomyopathy.  Ultimately, I recommended the following:      -Continue medical management of small OM 2 lesion unless new anginal symptoms arise.  -Continue dual antithrombotic therapy with Eliquis/Brilinta for 1 year (ending 2/2024), then transition to Eliquis monotherapy  -Check fasting lipid profile now  -Continue Crestor 40 mg daily for now; if LDL not at goal of 70 or less, will add ezetimibe  -Discussed strategies for identifying occasional episodes of palpitations/racing heartbeat.  Given very infrequent nature, Zio patch/event monitor is unlikely to be effective; Apple watch or alternative may be best option.  -Continue lisinopril 40 mg daily  -Continue Toprol- mg daily  -Continue amlodipine 2.5 mg daily  -Will message pharmacy liaison regarding coverage for SGLT2 inhibitors, and would recommend starting 1 if reasonably well covered        Follow-up: February 2023, with labs beforehand, or sooner if new medications started or new issues arise      On the date of the patient's visit, I spent a total of 70 minutes reviewing the patient's chart; interviewing, examining, and counseling the patient; coordinating with other providers as necessary, entering orders, and documenting in the medical chart.      Kendrick Hannah MD  Interventional Cardiology  May 22,  2023        Medications   Current Outpatient Medications   Medication     amLODIPine (NORVASC) 2.5 MG tablet     apixaban ANTICOAGULANT (ELIQUIS) 5 MG tablet     cyanocobalamin (VITAMIN B-12) 500 MCG tablet     escitalopram (LEXAPRO) 10 MG tablet     lisinopril (ZESTRIL) 40 MG tablet     metoprolol succinate ER (TOPROL XL) 100 MG 24 hr tablet     nitroGLYcerin (NITROSTAT) 0.4 MG sublingual tablet     rosuvastatin (CRESTOR) 40 MG tablet     ticagrelor (BRILINTA) 90 MG tablet     vitamin C (ASCORBIC ACID) 1000 MG TABS     Vitamin D3 (CHOLECALCIFEROL) 125 MCG (5000 UT) tablet     vitamin E 400 units TABS     aspirin (ASA) 81 MG EC tablet     No current facility-administered medications for this visit.     Facility-Administered Medications Ordered in Other Visits   Medication     iopamidol (ISOVUE-370) solution     Allergies   Allergies   Allergen Reactions     Penicillin G Rash         Physical Exam       BP: 112/72 Pulse: 68     SpO2: 96 %      Vital Signs with Ranges  Pulse:  [68] 68  BP: (112)/(72) 112/72  SpO2:  [96 %] 96 %  196 lbs 0 oz    Constitutional: Well-appearing, no acute distress  Respiratory: Normal respiratory effort, CTAB  Cardiovascular: RRR, no m/r/g.  JVP < 7 cm H2O.  There is no LE edema.  Normal carotid upstrokes, no carotid bruits.

## 2023-06-03 ENCOUNTER — HEALTH MAINTENANCE LETTER (OUTPATIENT)
Age: 58
End: 2023-06-03

## 2023-06-09 ENCOUNTER — TELEPHONE (OUTPATIENT)
Dept: CARDIOLOGY | Facility: CLINIC | Age: 58
End: 2023-06-09
Payer: COMMERCIAL

## 2023-06-09 NOTE — TELEPHONE ENCOUNTER
Pt had lipids and ALT drawn at [facility] on 6/8/23, results in Care Everywhere:             Last visit with Dr. Hannah on 5/22/23. Pt on rosuvastatin 40 mg daily. Routing to Dr. Hannah to review.

## 2023-06-09 NOTE — TELEPHONE ENCOUNTER
Received response from Dr. Hannah:     Kendrick Hannah MD Hair, Ashley W RN  Caller: Unspecified (Today, 10:15 AM)  Labs look good.  LDL is at goal.  Continue current medications.     Called pt, no answer. Left detailed VM advising per Dr. Hannah labs look good, LDL is at goal and Dr. Hannah recommends pt continue on current medications. Left call back number for Team 1 if any questions.

## 2023-07-17 ENCOUNTER — TELEPHONE (OUTPATIENT)
Dept: CARDIOLOGY | Facility: CLINIC | Age: 58
End: 2023-07-17
Payer: COMMERCIAL

## 2023-07-17 DIAGNOSIS — I48.0 PAROXYSMAL ATRIAL FIBRILLATION (H): Primary | ICD-10-CM

## 2023-07-17 DIAGNOSIS — E78.5 HYPERLIPIDEMIA WITH TARGET LDL LESS THAN 130: ICD-10-CM

## 2023-07-17 DIAGNOSIS — I25.110 CORONARY ARTERY DISEASE INVOLVING NATIVE CORONARY ARTERY OF NATIVE HEART WITH UNSTABLE ANGINA PECTORIS (H): ICD-10-CM

## 2023-07-17 RX ORDER — AMLODIPINE BESYLATE 2.5 MG/1
2.5 TABLET ORAL DAILY
Qty: 90 TABLET | Refills: 0 | Status: SHIPPED | OUTPATIENT
Start: 2023-07-17 | End: 2023-10-19

## 2023-07-17 RX ORDER — METOPROLOL SUCCINATE 100 MG/1
100 TABLET, EXTENDED RELEASE ORAL DAILY
Qty: 90 TABLET | Refills: 3 | Status: SHIPPED | OUTPATIENT
Start: 2023-07-17 | End: 2024-06-11

## 2023-07-17 RX ORDER — ROSUVASTATIN CALCIUM 40 MG/1
40 TABLET, COATED ORAL DAILY
Qty: 90 TABLET | Refills: 3 | Status: SHIPPED | OUTPATIENT
Start: 2023-07-17 | End: 2024-06-11

## 2023-07-17 RX ORDER — LISINOPRIL 40 MG/1
40 TABLET ORAL DAILY
Qty: 90 TABLET | Refills: 3 | Status: SHIPPED | OUTPATIENT
Start: 2023-07-17 | End: 2024-06-11

## 2023-07-17 NOTE — TELEPHONE ENCOUNTER
Called and spoke with pt, advised can send in prescriptions for all cardiac medications Eliquis 5 mg BID, Brilinta 90 mg BID, rosuvastatin 40 mg daily, amlodipine 2.5 mg daily, lisinopril 40 mg daily and metoprolol succinate 100 mg daily. Pt is also prescribed vitamin B-12 and Lexapro, advised these would need to be refilled by PCP office. Pt stated he has nitroglycerin tablets on hand to take PRN and has not needed this medication, declined need for new prescription. [facility] Cardiology Refill Guideline reviewed.  Medication meets criteria for refill.

## 2023-07-17 NOTE — TELEPHONE ENCOUNTER
[facility] Call Center    Phone Message    May a detailed message be left on voicemail: yes     Reason for Call: Other: Pt would like  a call back as he needs a med renewel as his pcp had left and he needs all his meds refilled and needs to discuss switching over meds to cardio team, Please reach out to discuss     Action Taken: Message routed to:  [facility] ([facility] Cardio    Travel Screening: Not Applicable

## 2023-07-23 ENCOUNTER — HEALTH MAINTENANCE LETTER (OUTPATIENT)
Age: 58
End: 2023-07-23

## 2023-10-01 ENCOUNTER — HEALTH MAINTENANCE LETTER (OUTPATIENT)
Age: 58
End: 2023-10-01

## 2023-10-19 DIAGNOSIS — I25.110 CORONARY ARTERY DISEASE INVOLVING NATIVE CORONARY ARTERY OF NATIVE HEART WITH UNSTABLE ANGINA PECTORIS (H): ICD-10-CM

## 2023-10-19 RX ORDER — AMLODIPINE BESYLATE 2.5 MG/1
2.5 TABLET ORAL DAILY
Qty: 90 TABLET | Refills: 1 | Status: SHIPPED | OUTPATIENT
Start: 2023-10-19 | End: 2024-06-11

## 2024-02-01 ENCOUNTER — TELEPHONE (OUTPATIENT)
Dept: CARDIOLOGY | Facility: CLINIC | Age: 59
End: 2024-02-01
Payer: COMMERCIAL

## 2024-02-01 NOTE — TELEPHONE ENCOUNTER
Received refill request for Brilinta 90 mg BID. Last visit on 5/22/23 with Dr. Hannah, plan to continue Brilinta until February 2024. Pt is s/p PCI to LAD on 2/8/23. Pt is scheduled for follow up with Dr. Hannah on 6/11/24. Called and spoke to pt, he said he is in [place] for the winter so was unable to schedule a sooner appointment. Pt stated he is feeling well, denied any recent chest pain or shortness of breath. Pt stated he has a couple weeks of medication left. Will review with Dr. Hannah if OK to discontinue Brilinta or if pt should continue until he is seen for follow up.

## 2024-02-05 NOTE — TELEPHONE ENCOUNTER
Received response from Dr. Hannah:     Kendrick Hannah MD Hair, Ashley W, RN; [facility] Heart Team 4  Caller: Unspecified (4 days ago, 11:53 AM)  Yes, he can stop the Brilinta now.    Thanks     Called back and spoke with pt, reviewed OK to stop Brilinta. Pt verbalized understanding. Medication removed from list.

## 2024-02-18 ENCOUNTER — HEALTH MAINTENANCE LETTER (OUTPATIENT)
Age: 59
End: 2024-02-18

## 2024-05-24 ENCOUNTER — LAB (OUTPATIENT)
Dept: LAB | Facility: CLINIC | Age: 59
End: 2024-05-24
Payer: COMMERCIAL

## 2024-05-24 DIAGNOSIS — I25.10 CORONARY ARTERY DISEASE INVOLVING NATIVE CORONARY ARTERY OF NATIVE HEART WITHOUT ANGINA PECTORIS: ICD-10-CM

## 2024-05-24 DIAGNOSIS — I50.42 CHRONIC COMBINED SYSTOLIC AND DIASTOLIC HEART FAILURE (H): ICD-10-CM

## 2024-05-24 LAB
ERYTHROCYTE [DISTWIDTH] IN BLOOD BY AUTOMATED COUNT: 12.3 % (ref 10–15)
HCT VFR BLD AUTO: 44.3 % (ref 40–53)
HGB BLD-MCNC: 14.9 G/DL (ref 13.3–17.7)
MCH RBC QN AUTO: 27.6 PG (ref 26.5–33)
MCHC RBC AUTO-ENTMCNC: 33.6 G/DL (ref 31.5–36.5)
MCV RBC AUTO: 82 FL (ref 78–100)
PLATELET # BLD AUTO: 197 10E3/UL (ref 150–450)
RBC # BLD AUTO: 5.39 10E6/UL (ref 4.4–5.9)
WBC # BLD AUTO: 8.8 10E3/UL (ref 4–11)

## 2024-05-24 PROCEDURE — 80053 COMPREHEN METABOLIC PANEL: CPT

## 2024-05-24 PROCEDURE — 85027 COMPLETE CBC AUTOMATED: CPT

## 2024-05-24 PROCEDURE — 36415 COLL VENOUS BLD VENIPUNCTURE: CPT

## 2024-05-25 LAB
ALBUMIN SERPL BCG-MCNC: 4.8 G/DL (ref 3.5–5.2)
ALP SERPL-CCNC: 65 U/L (ref 40–150)
ALT SERPL W P-5'-P-CCNC: 16 U/L (ref 0–70)
ANION GAP SERPL CALCULATED.3IONS-SCNC: 10 MMOL/L (ref 7–15)
AST SERPL W P-5'-P-CCNC: 21 U/L (ref 0–45)
BILIRUB SERPL-MCNC: 0.6 MG/DL
BUN SERPL-MCNC: 8.3 MG/DL (ref 6–20)
CALCIUM SERPL-MCNC: 9.6 MG/DL (ref 8.6–10)
CHLORIDE SERPL-SCNC: 103 MMOL/L (ref 98–107)
CREAT SERPL-MCNC: 0.9 MG/DL (ref 0.67–1.17)
DEPRECATED HCO3 PLAS-SCNC: 28 MMOL/L (ref 22–29)
EGFRCR SERPLBLD CKD-EPI 2021: >90 ML/MIN/1.73M2
GLUCOSE SERPL-MCNC: 102 MG/DL (ref 70–99)
POTASSIUM SERPL-SCNC: 3.7 MMOL/L (ref 3.4–5.3)
PROT SERPL-MCNC: 7.2 G/DL (ref 6.4–8.3)
SODIUM SERPL-SCNC: 141 MMOL/L (ref 135–145)

## 2024-06-11 ENCOUNTER — OFFICE VISIT (OUTPATIENT)
Dept: CARDIOLOGY | Facility: CLINIC | Age: 59
End: 2024-06-11
Payer: COMMERCIAL

## 2024-06-11 VITALS
BODY MASS INDEX: 27.67 KG/M2 | DIASTOLIC BLOOD PRESSURE: 76 MMHG | SYSTOLIC BLOOD PRESSURE: 140 MMHG | HEART RATE: 64 BPM | HEIGHT: 70 IN | WEIGHT: 193.3 LBS

## 2024-06-11 DIAGNOSIS — I48.0 PAROXYSMAL ATRIAL FIBRILLATION (H): ICD-10-CM

## 2024-06-11 DIAGNOSIS — I25.10 CORONARY ARTERY DISEASE INVOLVING NATIVE CORONARY ARTERY OF NATIVE HEART WITHOUT ANGINA PECTORIS: Primary | ICD-10-CM

## 2024-06-11 DIAGNOSIS — E78.5 HYPERLIPIDEMIA WITH TARGET LDL LESS THAN 130: ICD-10-CM

## 2024-06-11 DIAGNOSIS — E78.2 MIXED HYPERLIPIDEMIA: ICD-10-CM

## 2024-06-11 DIAGNOSIS — I48.92 PAROXYSMAL ATRIAL FLUTTER (H): ICD-10-CM

## 2024-06-11 DIAGNOSIS — I50.42 CHRONIC COMBINED SYSTOLIC AND DIASTOLIC HEART FAILURE (H): ICD-10-CM

## 2024-06-11 DIAGNOSIS — E11.9 TYPE 2 DIABETES MELLITUS WITHOUT COMPLICATION, WITHOUT LONG-TERM CURRENT USE OF INSULIN (H): ICD-10-CM

## 2024-06-11 DIAGNOSIS — R07.9 CHEST PAIN, UNSPECIFIED TYPE: ICD-10-CM

## 2024-06-11 DIAGNOSIS — I25.5 ISCHEMIC CARDIOMYOPATHY: ICD-10-CM

## 2024-06-11 DIAGNOSIS — I10 ESSENTIAL HYPERTENSION: ICD-10-CM

## 2024-06-11 DIAGNOSIS — I25.110 CORONARY ARTERY DISEASE INVOLVING NATIVE CORONARY ARTERY OF NATIVE HEART WITH UNSTABLE ANGINA PECTORIS (H): ICD-10-CM

## 2024-06-11 PROCEDURE — 99215 OFFICE O/P EST HI 40 MIN: CPT | Performed by: INTERNAL MEDICINE

## 2024-06-11 PROCEDURE — 93000 ELECTROCARDIOGRAM COMPLETE: CPT | Performed by: INTERNAL MEDICINE

## 2024-06-11 PROCEDURE — G2211 COMPLEX E/M VISIT ADD ON: HCPCS | Performed by: INTERNAL MEDICINE

## 2024-06-11 RX ORDER — METOPROLOL SUCCINATE 100 MG/1
100 TABLET, EXTENDED RELEASE ORAL DAILY
Qty: 90 TABLET | Refills: 3 | Status: SHIPPED | OUTPATIENT
Start: 2024-06-11

## 2024-06-11 RX ORDER — NITROGLYCERIN 0.4 MG/1
TABLET SUBLINGUAL
Qty: 20 TABLET | Refills: 0 | Status: SHIPPED | OUTPATIENT
Start: 2024-06-11

## 2024-06-11 RX ORDER — AMLODIPINE BESYLATE 2.5 MG/1
2.5 TABLET ORAL DAILY
Qty: 90 TABLET | Refills: 3 | Status: SHIPPED | OUTPATIENT
Start: 2024-06-11

## 2024-06-11 RX ORDER — ROSUVASTATIN CALCIUM 40 MG/1
40 TABLET, COATED ORAL DAILY
Qty: 90 TABLET | Refills: 3 | Status: SHIPPED | OUTPATIENT
Start: 2024-06-11

## 2024-06-11 RX ORDER — LISINOPRIL 40 MG/1
40 TABLET ORAL DAILY
Qty: 90 TABLET | Refills: 3 | Status: SHIPPED | OUTPATIENT
Start: 2024-06-11

## 2024-06-11 NOTE — PROGRESS NOTES
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 is an extremely pleasant 58 year old male here for routine follow-up.  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  His family history is significant for his older brother having significant coronary disease with prior stents and bypass surgery.  Patient is a never smoker.    Since his last visit in 5/2023, he continues to do well from a cardiac standpoint.  He occasionally jolts awake from sleep and clutches his chest, but no chest pain in other situations including no exertional symptoms.  No major bleeding issues.  He had a very busy last few days with a wedding, a late party until 4 AM last night, etc...  This is unusual for him, but explains his mildly elevated BP today.  He shows me a home BP log where his average BP is around 125/70.    His most recent labs are from 5/24/24, showing normal Na/K, normal Cr, and normal Hgb/Plt.  Most recent LDL was 52 in 6/2023.  His most recent echocardiogram was from 5/16/2023, showing an LVEF of 45-50% with septal wall hypokinesis and no significant valve disease, roughly unchanged from 2/2023 TTE.  His most recent ischemic evaluation was the coronary angiogram/PCI from 2/2023 described above.  His ECG in clinic today again showed sinus rhythm with LBBB.      Assessment & Plan     CAD c/b unstable angina s/p JUAN CARLOS x1-mid LAD in 2/2023  Residual moderate-severe stenosis of a relatively small OM 2, medically managed, CCS 0 angina  Paroxysmal AFL/A. tach, infrequent and  mildly symptomatic   Chronic HF with mildly reduced EF (LVEF 45-50% on 5/2023 TTE), euvolemic, NYHA II  Ischemic cardiomyopathy  Chronic LBBB  Hypertension, elevated today but typically well controlled at home  Dyslipidemia, well controlled  Non-insulin-dependent DM2  FELICITA on CPAP  Strong family history of CAD  Never smoker        It was a pleasure to meet with Maximus and his wife again in clinic today.  I am glad to hear he is feeling well from a cardiac standpoint.  We will check a repeat TTE today to see if his LVEF has recovered following PCI and with prolonged GDMT.  Presuming no unexpected findings on this, we will continue his current regimen unchanged and see him back in 1 year.      -Repeat TTE now  -Continue medical management of small OM 2 lesion unless new anginal symptoms arise.  -Completed 1 year of dual antithrombotic therapy with Eliquis/Brilinta for 1 year (ending 2/2024)  --Continue Eliquis monotherapy indefinitely  -Continue Crestor 40 mg daily  -Continue lisinopril 40 mg daily  -Continue Toprol- mg daily  -Continue amlodipine 2.5 mg daily      --Of note, patient reports needing dental procedure which may require Eliquis to be held.  If so, it is OK for him to hold the Eliquis as recommended by his dentist/oral surgeon (typically 48-72 hours beforehand).  However, I would recommend that he take a baby aspirin throughout the procedure instead.  To do so, I would suggest:    - 8 days prior to procedure, take 4 baby aspirin (324 mg total), in addition to usual Eliquis  - 7 days prior to procedure, take 1 baby aspirin (81 mg) daily, and STOP Eliquis  - Continue taking aspirin 81 mg daily throughout the procedure (do not discontinue)  - Once safe per dentist / oral surgeon post-procedure, resume Eliquis per usual dosing and take a single baby aspirin that day  - The following day, stop the baby aspirin and continue Eliquis monotherapy per usual dosing      Follow-up: 1 year, with labs beforehand,  or sooner if new issues arise        On the date of the patient's visit, I spent a total of 48 minutes reviewing the patient's chart; interviewing, examining, and counseling the patient; coordinating with other providers as necessary, entering orders, and documenting in the medical chart.          Kendrick Hannah MD  Interventional Cardiology  June 11, 2024      The longitudinal plan of care for the diagnosis(es)/condition(s) as documented were addressed during this visit. Due to the added complexity in care, I will continue to support Maximus in the subsequent management and with ongoing continuity of care.          Medications   Current Outpatient Medications   Medication Sig Dispense Refill    amLODIPine (NORVASC) 2.5 MG tablet Take 1 tablet (2.5 mg) by mouth daily 90 tablet 1    apixaban ANTICOAGULANT (ELIQUIS) 5 MG tablet Take 1 tablet (5 mg) by mouth 2 times daily 180 tablet 3    cyanocobalamin (VITAMIN B-12) 500 MCG tablet Take 500 mcg by mouth daily      escitalopram (LEXAPRO) 10 MG tablet Take 10 mg by mouth daily      lisinopril (ZESTRIL) 40 MG tablet Take 1 tablet (40 mg) by mouth daily 90 tablet 3    metoprolol succinate ER (TOPROL XL) 100 MG 24 hr tablet Take 1 tablet (100 mg) by mouth daily 90 tablet 3    nitroGLYcerin (NITROSTAT) 0.4 MG sublingual tablet For chest pain place 1 tablet under the tongue every 5 minutes for 3 doses. If symptoms persist 5 minutes after 1st dose call 911. 20 tablet 0    rosuvastatin (CRESTOR) 40 MG tablet Take 1 tablet (40 mg) by mouth daily 90 tablet 3    vitamin C (ASCORBIC ACID) 1000 MG TABS Take 1,000 mg by mouth daily      Vitamin D3 (CHOLECALCIFEROL) 125 MCG (5000 UT) tablet Take 125 mcg by mouth daily      vitamin E 400 units TABS Take 400 Units by mouth daily       No current facility-administered medications for this visit.     Facility-Administered Medications Ordered in Other Visits   Medication Dose Route Frequency Provider Last Rate Last Admin    iopamidol  (ISOVUE-370) solution    Once PRN Enrique Smith MD   125 mL at 02/10/23 1300     Allergies   Allergies   Allergen Reactions    Penicillin G Rash         Physical Exam       BP: (!) 140/76 Pulse: 64            Vital Signs with Ranges  Pulse:  [64] 64  BP: (140)/(76) 140/76  193 lbs 4.8 oz    Constitutional: Well-appearing, no acute distress  Respiratory: Normal respiratory effort, CTAB  Cardiovascular: RRR, no m/r/g.  JVP < 7 cm H2O.  There is no LE edema.  Normal carotid upstrokes, no carotid bruits.

## 2024-06-11 NOTE — LETTER
6/11/2024    Dariana Buitrago, APRN CNP  No address on file    RE: Maximus Harris Jr.       Dear Colleague,     I had the pleasure of seeing Maximus Harris Jr. in the [facility].  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 is an extremely pleasant 58 year old male here for routine follow-up.  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  His family history is significant for his older brother having significant coronary disease with prior stents and bypass surgery.  Patient is a never smoker.    Since his last visit in 5/2023, he continues to do well from a cardiac standpoint.  He occasionally jolts awake from sleep and clutches his chest, but no chest pain in other situations including no exertional symptoms.  No major bleeding issues.  He had a very busy last few days with a wedding, a late party until 4 AM last night, etc...  This is unusual for him, but explains his mildly elevated BP today.  He shows me a home BP log where his average BP is around 125/70.    His most recent labs are from 5/24/24, showing normal Na/K, normal Cr, and normal Hgb/Plt.  Most recent LDL was 52 in 6/2023.  His most recent echocardiogram was from 5/16/2023, showing an LVEF of 45-50% with septal wall hypokinesis and no significant valve disease, roughly unchanged from 2/2023 TTE.  His most recent ischemic evaluation was the coronary angiogram/PCI from 2/2023 described above.  His ECG in clinic  today again showed sinus rhythm with LBBB.      Assessment & Plan    CAD c/b unstable angina s/p JUAN CARLOS x1-mid LAD in 2/2023  Residual moderate-severe stenosis of a relatively small OM 2, medically managed, CCS 0 angina  Paroxysmal AFL/A. tach, infrequent and mildly symptomatic   Chronic HF with mildly reduced EF (LVEF 45-50% on 5/2023 TTE), euvolemic, NYHA II  Ischemic cardiomyopathy  Chronic LBBB  Hypertension, elevated today but typically well controlled at home  Dyslipidemia, well controlled  Non-insulin-dependent DM2  FELICITA on CPAP  Strong family history of CAD  Never smoker        It was a pleasure to meet with Maximus and his wife again in clinic today.  I am glad to hear he is feeling well from a cardiac standpoint.  We will check a repeat TTE today to see if his LVEF has recovered following PCI and with prolonged GDMT.  Presuming no unexpected findings on this, we will continue his current regimen unchanged and see him back in 1 year.      -Repeat TTE now  -Continue medical management of small OM 2 lesion unless new anginal symptoms arise.  -Completed 1 year of dual antithrombotic therapy with Eliquis/Brilinta for 1 year (ending 2/2024)  --Continue Eliquis monotherapy indefinitely  -Continue Crestor 40 mg daily  -Continue lisinopril 40 mg daily  -Continue Toprol- mg daily  -Continue amlodipine 2.5 mg daily      --Of note, patient reports needing dental procedure which may require Eliquis to be held.  If so, it is OK for him to hold the Eliquis as recommended by his dentist/oral surgeon (typically 48-72 hours beforehand).  However, I would recommend that he take a baby aspirin throughout the procedure instead.  To do so, I would suggest:    - 8 days prior to procedure, take 4 baby aspirin (324 mg total), in addition to usual Eliquis  - 7 days prior to procedure, take 1 baby aspirin (81 mg) daily, and STOP Eliquis  - Continue taking aspirin 81 mg daily throughout the procedure (do not discontinue)  - Once  safe per dentist / oral surgeon post-procedure, resume Eliquis per usual dosing and take a single baby aspirin that day  - The following day, stop the baby aspirin and continue Eliquis monotherapy per usual dosing      Follow-up: 1 year, with labs beforehand, or sooner if new issues arise        On the date of the patient's visit, I spent a total of 48 minutes reviewing the patient's chart; interviewing, examining, and counseling the patient; coordinating with other providers as necessary, entering orders, and documenting in the medical chart.          Kendrick Hannah MD  Interventional Cardiology  June 11, 2024      The longitudinal plan of care for the diagnosis(es)/condition(s) as documented were addressed during this visit. Due to the added complexity in care, I will continue to support Maximus in the subsequent management and with ongoing continuity of care.          Medications  Current Outpatient Medications   Medication Sig Dispense Refill    amLODIPine (NORVASC) 2.5 MG tablet Take 1 tablet (2.5 mg) by mouth daily 90 tablet 1    apixaban ANTICOAGULANT (ELIQUIS) 5 MG tablet Take 1 tablet (5 mg) by mouth 2 times daily 180 tablet 3    cyanocobalamin (VITAMIN B-12) 500 MCG tablet Take 500 mcg by mouth daily      escitalopram (LEXAPRO) 10 MG tablet Take 10 mg by mouth daily      lisinopril (ZESTRIL) 40 MG tablet Take 1 tablet (40 mg) by mouth daily 90 tablet 3    metoprolol succinate ER (TOPROL XL) 100 MG 24 hr tablet Take 1 tablet (100 mg) by mouth daily 90 tablet 3    nitroGLYcerin (NITROSTAT) 0.4 MG sublingual tablet For chest pain place 1 tablet under the tongue every 5 minutes for 3 doses. If symptoms persist 5 minutes after 1st dose call 911. 20 tablet 0    rosuvastatin (CRESTOR) 40 MG tablet Take 1 tablet (40 mg) by mouth daily 90 tablet 3    vitamin C (ASCORBIC ACID) 1000 MG TABS Take 1,000 mg by mouth daily      Vitamin D3 (CHOLECALCIFEROL) 125 MCG (5000 UT) tablet Take 125 mcg by mouth daily       vitamin E 400 units TABS Take 400 Units by mouth daily       No current facility-administered medications for this visit.     Facility-Administered Medications Ordered in Other Visits   Medication Dose Route Frequency Provider Last Rate Last Admin    iopamidol (ISOVUE-370) solution    Once PRN Enrique Smith MD   125 mL at 02/10/23 1300     Allergies  Allergies   Allergen Reactions    Penicillin G Rash         Physical Exam      BP: (!) 140/76 Pulse: 64            Vital Signs with Ranges  Pulse:  [64] 64  BP: (140)/(76) 140/76  193 lbs 4.8 oz    Constitutional: Well-appearing, no acute distress  Respiratory: Normal respiratory effort, CTAB  Cardiovascular: RRR, no m/r/g.  JVP < 7 cm H2O.  There is no LE edema.  Normal carotid upstrokes, no carotid bruits.      Thank you for allowing me to participate in the care of your patient.      Sincerely,     Kendrick Hannah MD     [facility]  cc:   Kendrick Hannah MD  [address]  [place] [address]

## 2024-07-07 ENCOUNTER — HEALTH MAINTENANCE LETTER (OUTPATIENT)
Age: 59
End: 2024-07-07

## 2024-08-02 ENCOUNTER — HOSPITAL ENCOUNTER (OUTPATIENT)
Dept: CARDIOLOGY | Facility: CLINIC | Age: 59
Discharge: HOME | End: 2024-08-02
Attending: INTERNAL MEDICINE | Admitting: INTERNAL MEDICINE
Payer: COMMERCIAL

## 2024-08-02 DIAGNOSIS — I25.10 CORONARY ARTERY DISEASE INVOLVING NATIVE CORONARY ARTERY OF NATIVE HEART WITHOUT ANGINA PECTORIS: ICD-10-CM

## 2024-08-02 LAB — LVEF ECHO: NORMAL

## 2024-08-02 PROCEDURE — 93306 TTE W/DOPPLER COMPLETE: CPT | Mod: 26 | Performed by: INTERNAL MEDICINE

## 2024-08-02 PROCEDURE — 93306 TTE W/DOPPLER COMPLETE: CPT

## 2024-11-24 ENCOUNTER — HEALTH MAINTENANCE LETTER (OUTPATIENT)
Age: 59
End: 2024-11-24

## 2025-03-09 ENCOUNTER — HEALTH MAINTENANCE LETTER (OUTPATIENT)
Age: 60
End: 2025-03-09

## 2025-06-16 ENCOUNTER — LAB (OUTPATIENT)
Dept: LAB | Facility: CLINIC | Age: 60
End: 2025-06-16
Payer: COMMERCIAL

## 2025-06-16 DIAGNOSIS — E11.9 TYPE 2 DIABETES MELLITUS WITHOUT COMPLICATION, WITHOUT LONG-TERM CURRENT USE OF INSULIN (H): ICD-10-CM

## 2025-06-16 DIAGNOSIS — I10 ESSENTIAL HYPERTENSION: ICD-10-CM

## 2025-06-16 DIAGNOSIS — I50.42 CHRONIC COMBINED SYSTOLIC AND DIASTOLIC HEART FAILURE (H): ICD-10-CM

## 2025-06-16 DIAGNOSIS — I25.10 CORONARY ARTERY DISEASE INVOLVING NATIVE CORONARY ARTERY OF NATIVE HEART WITHOUT ANGINA PECTORIS: ICD-10-CM

## 2025-06-16 DIAGNOSIS — I25.5 ISCHEMIC CARDIOMYOPATHY: ICD-10-CM

## 2025-06-16 DIAGNOSIS — E78.2 MIXED HYPERLIPIDEMIA: ICD-10-CM

## 2025-06-16 LAB
ALT SERPL W P-5'-P-CCNC: 18 U/L (ref 0–70)
ANION GAP SERPL CALCULATED.3IONS-SCNC: 17 MMOL/L (ref 7–15)
BUN SERPL-MCNC: 13.8 MG/DL (ref 6–20)
CALCIUM SERPL-MCNC: 9.4 MG/DL (ref 8.8–10.4)
CHLORIDE SERPL-SCNC: 103 MMOL/L (ref 98–107)
CHOLEST SERPL-MCNC: 142 MG/DL
CREAT SERPL-MCNC: 0.96 MG/DL (ref 0.67–1.17)
EGFRCR SERPLBLD CKD-EPI 2021: >90 ML/MIN/1.73M2
ERYTHROCYTE [DISTWIDTH] IN BLOOD BY AUTOMATED COUNT: 12.3 % (ref 10–15)
FASTING STATUS PATIENT QL REPORTED: YES
FASTING STATUS PATIENT QL REPORTED: YES
GLUCOSE SERPL-MCNC: 114 MG/DL (ref 70–99)
HCO3 SERPL-SCNC: 22 MMOL/L (ref 22–29)
HCT VFR BLD AUTO: 41.1 % (ref 40–53)
HDLC SERPL-MCNC: 38 MG/DL
HGB BLD-MCNC: 14.3 G/DL (ref 13.3–17.7)
LDLC SERPL CALC-MCNC: 71 MG/DL
MCH RBC QN AUTO: 28.9 PG (ref 26.5–33)
MCHC RBC AUTO-ENTMCNC: 34.8 G/DL (ref 31.5–36.5)
MCV RBC AUTO: 83 FL (ref 78–100)
NONHDLC SERPL-MCNC: 104 MG/DL
PLATELET # BLD AUTO: 184 10E3/UL (ref 150–450)
POTASSIUM SERPL-SCNC: 3.7 MMOL/L (ref 3.4–5.3)
RBC # BLD AUTO: 4.95 10E6/UL (ref 4.4–5.9)
SODIUM SERPL-SCNC: 142 MMOL/L (ref 135–145)
TRIGL SERPL-MCNC: 166 MG/DL
WBC # BLD AUTO: 7.5 10E3/UL (ref 4–11)

## 2025-06-16 PROCEDURE — 80061 LIPID PANEL: CPT

## 2025-06-16 PROCEDURE — 36415 COLL VENOUS BLD VENIPUNCTURE: CPT

## 2025-06-16 PROCEDURE — 80048 BASIC METABOLIC PNL TOTAL CA: CPT

## 2025-06-16 PROCEDURE — 85027 COMPLETE CBC AUTOMATED: CPT

## 2025-06-16 PROCEDURE — 84460 ALANINE AMINO (ALT) (SGPT): CPT

## 2025-06-18 ENCOUNTER — OFFICE VISIT (OUTPATIENT)
Dept: CARDIOLOGY | Facility: CLINIC | Age: 60
End: 2025-06-18
Payer: COMMERCIAL

## 2025-06-18 VITALS
HEIGHT: 69 IN | OXYGEN SATURATION: 95 % | SYSTOLIC BLOOD PRESSURE: 144 MMHG | DIASTOLIC BLOOD PRESSURE: 90 MMHG | HEART RATE: 60 BPM | WEIGHT: 202.3 LBS | BODY MASS INDEX: 29.96 KG/M2

## 2025-06-18 DIAGNOSIS — E78.5 HYPERLIPIDEMIA WITH TARGET LDL LESS THAN 130: ICD-10-CM

## 2025-06-18 DIAGNOSIS — I50.42 CHRONIC COMBINED SYSTOLIC AND DIASTOLIC HEART FAILURE (H): ICD-10-CM

## 2025-06-18 DIAGNOSIS — I25.10 CORONARY ARTERY DISEASE INVOLVING NATIVE CORONARY ARTERY OF NATIVE HEART WITHOUT ANGINA PECTORIS: Primary | ICD-10-CM

## 2025-06-18 DIAGNOSIS — R07.9 CHEST PAIN, UNSPECIFIED TYPE: ICD-10-CM

## 2025-06-18 DIAGNOSIS — I25.5 ISCHEMIC CARDIOMYOPATHY: ICD-10-CM

## 2025-06-18 DIAGNOSIS — I48.0 PAROXYSMAL ATRIAL FIBRILLATION (H): ICD-10-CM

## 2025-06-18 DIAGNOSIS — I48.92 PAROXYSMAL ATRIAL FLUTTER (H): ICD-10-CM

## 2025-06-18 DIAGNOSIS — E11.9 TYPE 2 DIABETES MELLITUS WITHOUT COMPLICATION, WITHOUT LONG-TERM CURRENT USE OF INSULIN (H): ICD-10-CM

## 2025-06-18 RX ORDER — AMLODIPINE BESYLATE 2.5 MG/1
2.5 TABLET ORAL DAILY
Qty: 90 TABLET | Refills: 3 | Status: SHIPPED | OUTPATIENT
Start: 2025-06-18

## 2025-06-18 RX ORDER — METOPROLOL SUCCINATE 100 MG/1
100 TABLET, EXTENDED RELEASE ORAL DAILY
Qty: 90 TABLET | Refills: 3 | Status: SHIPPED | OUTPATIENT
Start: 2025-06-18

## 2025-06-18 RX ORDER — LISINOPRIL 40 MG/1
40 TABLET ORAL DAILY
Qty: 90 TABLET | Refills: 3 | Status: SHIPPED | OUTPATIENT
Start: 2025-06-18

## 2025-06-18 RX ORDER — ROSUVASTATIN CALCIUM 40 MG/1
40 TABLET, COATED ORAL DAILY
Qty: 90 TABLET | Refills: 3 | Status: SHIPPED | OUTPATIENT
Start: 2025-06-18

## 2025-06-18 NOTE — PROGRESS NOTES
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 is an extremely pleasant 60 year old male here for routine follow-up.  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  His family history is significant for his older brother having significant coronary disease with prior stents and bypass surgery.  Patient is a never smoker.    Since his last visit in 6/2024, he continues to do well from a cardiac standpoint.  As before, he occasionally jolts awake from sleep and clutches his chest with pain lasting a few seconds, but no chest pain in other situations including no exertional symptoms.  No major bleeding issues.  BP today was 140/90, though he has not yet taken his blood pressure medication.  He has several home BP readings that he shows me in the 110s/70s.  He and his wife do note that since moving into an apartment rather than living out of their RV, they are less active on a daily basis doing less walking, bike riding, etc.  He does make a point of trying to go to the gym at their apartment complex on a regular basis, and does well with this, but overall his diet and exercise regimen has worsened over the past year.    His most recent labs are from 6/16/2025, showing total cholesterol 142, HDL 38, LDL 71, triglycerides 166, normal sodium and potassium, normal renal function, normal hemoglobin and normal platelets.  His most recent echocardiogram was from 8/2/2024, showing LVEF of 45-50%, normal RV size and function, and no significant valve abnormalities,  roughly unchanged from his prior echo on 5/16/2023. His most recent ischemic evaluation was the coronary angiogram/PCI from 2/2023 described above.  His ECG in clinic on 6/11/2024 again showed sinus rhythm with LBBB.      Assessment & Plan     CAD c/b unstable angina s/p JUAN CARLOS x1-mid LAD in 2/2023  Residual moderate-severe stenosis of a relatively small OM 2, medically managed, CCS 0 angina  Brief, sporadic nocturnal chest pains have been longstanding and unlikely cardiac in nature  Paroxysmal AFL/A. tach, infrequent and mildly symptomatic   Chronic HF with mildly reduced EF (LVEF 45-50% on 8/2024 TTE), euvolemic, NYHA I  Ischemic cardiomyopathy  Chronic LBBB  Hypertension, elevated today but typically well controlled at home  Dyslipidemia, with borderline control  Non-insulin-dependent DM2, managed by PCP  FELICITA on CPAP  Strong family history of CAD  Never smoker        It was a pleasure to speak with Maximus and his wife again in clinic today.  Overall I think he is doing well, and he clearly wants to do the right thing in terms of his lifestyle.  We discussed that it is very common after a heart attack/stent placement that patients will be very aggressive in following a heart healthy lifestyle, but that can gradually fall off over the course of several years as they get removed from the actual incident.  I think that may be happening here, and also his more sedentary lifestyle since transitioning to apartment living from living in an  is probably also playing a role.  At present, his blood pressures on his home monitor seem reasonable, but his lipids are mildly above goal, with an LDL of 71.  Given his known significant coronary disease, I would like to see his LDL under 55.  We talked about options for this, and for now he will work on aggressive lifestyle interventions for the next 3 months, and then we can reassess where his lipids are at (he will also be reassessing his A1c in a few months).  Depending on these  results, and his home BP monitoring, we can make any changes that may be necessary.      -Strong focus on improved diet/physical activity regimen as discussed above  -Repeat labs in 2-3 months; further medication adjustment pending these results  -Continue medical management of small OM 2 lesion unless new anginal symptoms arise.  -Completed 1 year of dual antithrombotic therapy with Eliquis/Brilinta for 1 year in 2/2024  --Continue Eliquis monotherapy indefinitely  -Continue Crestor 40 mg daily for now  -Continue lisinopril 40 mg daily  -Continue Toprol- mg daily  -Continue amlodipine 2.5 mg daily      Follow-up: 3 months with SHANTELL, with labs beforehand, or sooner if new issues arise        On the date of the patient's visit, I spent a total of 42 minutes reviewing the patient's chart; interviewing, examining, and counseling the patient; coordinating with other providers as necessary, entering orders, and documenting in the medical chart.          Kendrick Hannah MD  Interventional Cardiology  June 18, 2025      The longitudinal plan of care for the diagnosis(es)/condition(s) as documented were addressed during this visit. Due to the added complexity in care, I will continue to support Maximus in the subsequent management and with ongoing continuity of care.          Medications   Current Outpatient Medications   Medication Sig Dispense Refill    amLODIPine (NORVASC) 2.5 MG tablet Take 1 tablet (2.5 mg) by mouth daily 90 tablet 3    apixaban ANTICOAGULANT (ELIQUIS) 5 MG tablet Take 1 tablet (5 mg) by mouth 2 times daily 180 tablet 3    cyanocobalamin (VITAMIN B-12) 500 MCG tablet Take 500 mcg by mouth daily      escitalopram (LEXAPRO) 10 MG tablet Take 10 mg by mouth daily      lisinopril (ZESTRIL) 40 MG tablet Take 1 tablet (40 mg) by mouth daily 90 tablet 3    metoprolol succinate ER (TOPROL XL) 100 MG 24 hr tablet Take 1 tablet (100 mg) by mouth daily 90 tablet 3    nitroGLYcerin (NITROSTAT) 0.4 MG  sublingual tablet For chest pain place 1 tablet under the tongue every 5 minutes for 3 doses. If symptoms persist 5 minutes after 1st dose call 911. 20 tablet 0    rosuvastatin (CRESTOR) 40 MG tablet Take 1 tablet (40 mg) by mouth daily 90 tablet 3    vitamin C (ASCORBIC ACID) 1000 MG TABS Take 1,000 mg by mouth daily      Vitamin D3 (CHOLECALCIFEROL) 125 MCG (5000 UT) tablet Take 125 mcg by mouth daily      vitamin E 400 units TABS Take 400 Units by mouth daily       No current facility-administered medications for this visit.     Facility-Administered Medications Ordered in Other Visits   Medication Dose Route Frequency Provider Last Rate Last Admin    iopamidol (ISOVUE-370) solution    Once PRN Enrique Smith MD   125 mL at 02/10/23 1300     Allergies   Allergies   Allergen Reactions    Penicillins Hives    Penicillin G Rash         Physical Exam       BP: (!) 144/90 (has not taken bp meds yet) Pulse: 60     SpO2: 95 %      Vital Signs with Ranges  Pulse:  [60] 60  BP: (144)/(90) 144/90  SpO2:  [95 %] 95 %  202 lbs 4.8 oz    Constitutional: Well-appearing, no acute distress  Respiratory: Normal respiratory effort, CTAB  Cardiovascular: RRR, no m/r/g.  JVP < 7 cm H2O.  There is no LE edema.  Normal carotid upstrokes, no carotid bruits.

## 2025-06-18 NOTE — LETTER
6/18/2025    Dariana Buitrago, APRN CNP  No address on file    RE: Maximus Harris Jr.       Dear Colleague,     I had the pleasure of seeing Maximus Harris Jr. in the [facility].  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 is an extremely pleasant 60 year old male here for routine follow-up.  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  His family history is significant for his older brother having significant coronary disease with prior stents and bypass surgery.  Patient is a never smoker.    Since his last visit in 6/2024, he continues to do well from a cardiac standpoint.  As before, he occasionally jolts awake from sleep and clutches his chest with pain lasting a few seconds, but no chest pain in other situations including no exertional symptoms.  No major bleeding issues.  BP today was 140/90, though he has not yet taken his blood pressure medication.  He has several home BP readings that he shows me in the 110s/70s.  He and his wife do note that since moving into an apartment rather than living out of their RV, they are less active on a daily basis doing less walking, bike riding, etc.  He does make a point of trying to go to the gym at their apartment complex on a regular basis, and does well with this, but overall his diet and exercise regimen has worsened over the past year.    His most recent labs are from 6/16/2025, showing total cholesterol 142, HDL 38, LDL 71, triglycerides 166, normal sodium and potassium,  normal renal function, normal hemoglobin and normal platelets.  His most recent echocardiogram was from 8/2/2024, showing LVEF of 45-50%, normal RV size and function, and no significant valve abnormalities, roughly unchanged from his prior echo on 5/16/2023. His most recent ischemic evaluation was the coronary angiogram/PCI from 2/2023 described above.  His ECG in clinic on 6/11/2024 again showed sinus rhythm with LBBB.      Assessment & Plan    CAD c/b unstable angina s/p JUAN CARLOS x1-mid LAD in 2/2023  Residual moderate-severe stenosis of a relatively small OM 2, medically managed, CCS 0 angina  Brief, sporadic nocturnal chest pains have been longstanding and unlikely cardiac in nature  Paroxysmal AFL/A. tach, infrequent and mildly symptomatic   Chronic HF with mildly reduced EF (LVEF 45-50% on 8/2024 TTE), euvolemic, NYHA I  Ischemic cardiomyopathy  Chronic LBBB  Hypertension, elevated today but typically well controlled at home  Dyslipidemia, with borderline control  Non-insulin-dependent DM2, managed by PCP  FELICITA on CPAP  Strong family history of CAD  Never smoker        It was a pleasure to speak with Maximus and his wife again in clinic today.  Overall I think he is doing well, and he clearly wants to do the right thing in terms of his lifestyle.  We discussed that it is very common after a heart attack/stent placement that patients will be very aggressive in following a heart healthy lifestyle, but that can gradually fall off over the course of several years as they get removed from the actual incident.  I think that may be happening here, and also his more sedentary lifestyle since transitioning to apartment living from living in an  is probably also playing a role.  At present, his blood pressures on his home monitor seem reasonable, but his lipids are mildly above goal, with an LDL of 71.  Given his known significant coronary disease, I would like to see his LDL under 55.  We talked about options for this, and  for now he will work on aggressive lifestyle interventions for the next 3 months, and then we can reassess where his lipids are at (he will also be reassessing his A1c in a few months).  Depending on these results, and his home BP monitoring, we can make any changes that may be necessary.      -Strong focus on improved diet/physical activity regimen as discussed above  -Repeat labs in 2-3 months; further medication adjustment pending these results  -Continue medical management of small OM 2 lesion unless new anginal symptoms arise.  -Completed 1 year of dual antithrombotic therapy with Eliquis/Brilinta for 1 year in 2/2024  --Continue Eliquis monotherapy indefinitely  -Continue Crestor 40 mg daily for now  -Continue lisinopril 40 mg daily  -Continue Toprol- mg daily  -Continue amlodipine 2.5 mg daily      Follow-up: 3 months with SHANTELL, with labs beforehand, or sooner if new issues arise        On the date of the patient's visit, I spent a total of 42 minutes reviewing the patient's chart; interviewing, examining, and counseling the patient; coordinating with other providers as necessary, entering orders, and documenting in the medical chart.          Kendrick Hannah MD  Interventional Cardiology  June 18, 2025      The longitudinal plan of care for the diagnosis(es)/condition(s) as documented were addressed during this visit. Due to the added complexity in care, I will continue to support Maximus in the subsequent management and with ongoing continuity of care.          Medications  Current Outpatient Medications   Medication Sig Dispense Refill     amLODIPine (NORVASC) 2.5 MG tablet Take 1 tablet (2.5 mg) by mouth daily 90 tablet 3     apixaban ANTICOAGULANT (ELIQUIS) 5 MG tablet Take 1 tablet (5 mg) by mouth 2 times daily 180 tablet 3     cyanocobalamin (VITAMIN B-12) 500 MCG tablet Take 500 mcg by mouth daily       escitalopram (LEXAPRO) 10 MG tablet Take 10 mg by mouth daily       lisinopril (ZESTRIL) 40  MG tablet Take 1 tablet (40 mg) by mouth daily 90 tablet 3     metoprolol succinate ER (TOPROL XL) 100 MG 24 hr tablet Take 1 tablet (100 mg) by mouth daily 90 tablet 3     nitroGLYcerin (NITROSTAT) 0.4 MG sublingual tablet For chest pain place 1 tablet under the tongue every 5 minutes for 3 doses. If symptoms persist 5 minutes after 1st dose call 911. 20 tablet 0     rosuvastatin (CRESTOR) 40 MG tablet Take 1 tablet (40 mg) by mouth daily 90 tablet 3     vitamin C (ASCORBIC ACID) 1000 MG TABS Take 1,000 mg by mouth daily       Vitamin D3 (CHOLECALCIFEROL) 125 MCG (5000 UT) tablet Take 125 mcg by mouth daily       vitamin E 400 units TABS Take 400 Units by mouth daily       No current facility-administered medications for this visit.     Facility-Administered Medications Ordered in Other Visits   Medication Dose Route Frequency Provider Last Rate Last Admin     iopamidol (ISOVUE-370) solution    Once PRN Enrique Smith MD   125 mL at 02/10/23 1300     Allergies  Allergies   Allergen Reactions     Penicillins Hives     Penicillin G Rash         Physical Exam      BP: (!) 144/90 (has not taken bp meds yet) Pulse: 60     SpO2: 95 %      Vital Signs with Ranges  Pulse:  [60] 60  BP: (144)/(90) 144/90  SpO2:  [95 %] 95 %  202 lbs 4.8 oz    Constitutional: Well-appearing, no acute distress  Respiratory: Normal respiratory effort, CTAB  Cardiovascular: RRR, no m/r/g.  JVP < 7 cm H2O.  There is no LE edema.  Normal carotid upstrokes, no carotid bruits.    Thank you for allowing me to participate in the care of your patient.      Sincerely,     Kendrick Hannah MD     [facility]  cc:   Kendrick Hannah MD  [address] [address]

## 2025-06-28 ENCOUNTER — HEALTH MAINTENANCE LETTER (OUTPATIENT)
Age: 60
End: 2025-06-28

## 2025-08-26 ENCOUNTER — MYC MEDICAL ADVICE (OUTPATIENT)
Dept: CARDIOLOGY | Facility: CLINIC | Age: 60
End: 2025-08-26
Payer: COMMERCIAL

## 2025-08-26 DIAGNOSIS — R07.9 CHEST PAIN, UNSPECIFIED TYPE: ICD-10-CM

## 2025-08-26 RX ORDER — NITROGLYCERIN 0.4 MG/1
TABLET SUBLINGUAL
Qty: 25 TABLET | Refills: 1 | Status: SHIPPED | OUTPATIENT
Start: 2025-08-26

## (undated) DEVICE — TOTE ANGIO CORP PC15AT SAN32CC83O

## (undated) DEVICE — CATH ANGIO INFINITI 3DRC 6FRX100CM 534676T

## (undated) DEVICE — DEFIB PRO-PADZ LVP LQD GEL ADULT 8900-2105-01

## (undated) DEVICE — GUIDEWIRE VASC 0.014INX180CM RUNTHROUGH 25-1011

## (undated) DEVICE — MANIFOLD KIT ANGIO AUTOMATED 014613

## (undated) DEVICE — CATH BALLOON NC EUPHORA 3.50X12MM NCEUP3512X

## (undated) DEVICE — CATH ANGIO JUDKINS JL4 6FRX100CM INFINITI 534620T

## (undated) DEVICE — INTRO SHEATH 6FRX10CM PINNACLE RSS602

## (undated) DEVICE — KIT HAND CONTROL ANGIOTOUCH ACIST 65CM AT-P65

## (undated) DEVICE — CATH GUIDING  6F MACH 1 GUIDING CLS3.5

## (undated) DEVICE — INTRODUCER CATH VASC 5FRX10CM  MPIS-501-NT-U-SST

## (undated) DEVICE — CATH BALLOON EMERGE 3.0X12MM H7493918912300

## (undated) DEVICE — INFL DVC BASIXCOMPAK PLYCRB 30 ATM 13IN 20ML IN4530

## (undated) RX ORDER — REGADENOSON 0.08 MG/ML
INJECTION, SOLUTION INTRAVENOUS
Status: DISPENSED
Start: 2023-02-09

## (undated) RX ORDER — HEPARIN SODIUM 200 [USP'U]/100ML
INJECTION, SOLUTION INTRAVENOUS
Status: DISPENSED
Start: 2023-02-10

## (undated) RX ORDER — FENTANYL CITRATE 50 UG/ML
INJECTION, SOLUTION INTRAMUSCULAR; INTRAVENOUS
Status: DISPENSED
Start: 2023-02-10

## (undated) RX ORDER — HEPARIN SODIUM 1000 [USP'U]/ML
INJECTION, SOLUTION INTRAVENOUS; SUBCUTANEOUS
Status: DISPENSED
Start: 2023-02-10

## (undated) RX ORDER — NITROGLYCERIN 5 MG/ML
VIAL (ML) INTRAVENOUS
Status: DISPENSED
Start: 2023-02-10

## (undated) RX ORDER — LIDOCAINE HYDROCHLORIDE 10 MG/ML
INJECTION, SOLUTION EPIDURAL; INFILTRATION; INTRACAUDAL; PERINEURAL
Status: DISPENSED
Start: 2023-02-10